# Patient Record
Sex: FEMALE | Race: WHITE | Employment: UNEMPLOYED | URBAN - NONMETROPOLITAN AREA
[De-identification: names, ages, dates, MRNs, and addresses within clinical notes are randomized per-mention and may not be internally consistent; named-entity substitution may affect disease eponyms.]

---

## 2020-04-08 RX ORDER — DESVENLAFAXINE 50 MG/1
50 TABLET, EXTENDED RELEASE ORAL DAILY
Qty: 30 TABLET | Refills: 4 | OUTPATIENT
Start: 2020-04-08

## 2020-04-08 NOTE — TELEPHONE ENCOUNTER
I don't know this patient.     I am not sure who this patient is and I can not refill the medication

## 2020-05-06 ENCOUNTER — TELEPHONE (OUTPATIENT)
Dept: FAMILY MEDICINE CLINIC | Age: 25
End: 2020-05-06

## 2024-06-22 ENCOUNTER — HOSPITAL ENCOUNTER (INPATIENT)
Age: 29
LOS: 8 days | Discharge: HOME | DRG: 776 | End: 2024-06-30
Attending: PSYCHIATRY & NEUROLOGY | Admitting: PSYCHIATRY & NEUROLOGY
Payer: COMMERCIAL

## 2024-06-22 DIAGNOSIS — F39 MOOD DISORDER (HCC): Primary | ICD-10-CM

## 2024-06-22 PROBLEM — F23 ACUTE PSYCHOSIS (HCC): Status: ACTIVE | Noted: 2024-06-22

## 2024-06-22 LAB
ALBUMIN SERPL-MCNC: 4.8 G/DL (ref 3.5–5.2)
ALP SERPL-CCNC: 46 U/L (ref 35–104)
ALT SERPL-CCNC: 10 U/L (ref 0–32)
AMPHET UR QL SCN: NEGATIVE
ANION GAP SERPL CALCULATED.3IONS-SCNC: 17 MMOL/L (ref 7–16)
APAP SERPL-MCNC: <5 UG/ML (ref 10–30)
AST SERPL-CCNC: 16 U/L (ref 0–31)
BARBITURATES UR QL SCN: NEGATIVE
BASOPHILS # BLD: 0.05 K/UL (ref 0–0.2)
BASOPHILS NFR BLD: 0 % (ref 0–2)
BENZODIAZ UR QL: NEGATIVE
BILIRUB SERPL-MCNC: 0.4 MG/DL (ref 0–1.2)
BUN SERPL-MCNC: 8 MG/DL (ref 6–20)
BUPRENORPHINE UR QL: POSITIVE
CALCIUM SERPL-MCNC: 10 MG/DL (ref 8.6–10.2)
CANNABINOIDS UR QL SCN: NEGATIVE
CHLORIDE SERPL-SCNC: 102 MMOL/L (ref 98–107)
CO2 SERPL-SCNC: 20 MMOL/L (ref 22–29)
COCAINE UR QL SCN: NEGATIVE
CREAT SERPL-MCNC: 0.7 MG/DL (ref 0.5–1)
EOSINOPHIL # BLD: 0.01 K/UL (ref 0.05–0.5)
EOSINOPHILS RELATIVE PERCENT: 0 % (ref 0–6)
ERYTHROCYTE [DISTWIDTH] IN BLOOD BY AUTOMATED COUNT: 12.5 % (ref 11.5–15)
ETHANOLAMINE SERPL-MCNC: <10 MG/DL (ref 0–0.08)
FENTANYL UR QL: NEGATIVE
GFR, ESTIMATED: >90 ML/MIN/1.73M2
GLUCOSE SERPL-MCNC: 129 MG/DL (ref 74–99)
HCG, URINE, POC: NEGATIVE
HCT VFR BLD AUTO: 43.3 % (ref 34–48)
HGB BLD-MCNC: 14.9 G/DL (ref 11.5–15.5)
IMM GRANULOCYTES # BLD AUTO: 0.06 K/UL (ref 0–0.58)
IMM GRANULOCYTES NFR BLD: 1 % (ref 0–5)
LYMPHOCYTES NFR BLD: 2.12 K/UL (ref 1.5–4)
LYMPHOCYTES RELATIVE PERCENT: 16 % (ref 20–42)
Lab: NORMAL
MCH RBC QN AUTO: 32.3 PG (ref 26–35)
MCHC RBC AUTO-ENTMCNC: 34.4 G/DL (ref 32–34.5)
MCV RBC AUTO: 93.7 FL (ref 80–99.9)
METHADONE UR QL: NEGATIVE
MONOCYTES NFR BLD: 1.48 K/UL (ref 0.1–0.95)
MONOCYTES NFR BLD: 11 % (ref 2–12)
NEGATIVE QC PASS/FAIL: NORMAL
NEUTROPHILS NFR BLD: 72 % (ref 43–80)
NEUTS SEG NFR BLD: 9.45 K/UL (ref 1.8–7.3)
OPIATES UR QL SCN: NEGATIVE
OXYCODONE UR QL SCN: NEGATIVE
PCP UR QL SCN: NEGATIVE
PLATELET # BLD AUTO: 286 K/UL (ref 130–450)
PMV BLD AUTO: 10 FL (ref 7–12)
POSITIVE QC PASS/FAIL: NORMAL
POTASSIUM SERPL-SCNC: 3.7 MMOL/L (ref 3.5–5)
PROT SERPL-MCNC: 8.1 G/DL (ref 6.4–8.3)
RBC # BLD AUTO: 4.62 M/UL (ref 3.5–5.5)
SALICYLATES SERPL-MCNC: <0.3 MG/DL (ref 0–30)
SODIUM SERPL-SCNC: 139 MMOL/L (ref 132–146)
TEST INFORMATION: ABNORMAL
TOXIC TRICYCLIC SC,BLOOD: NEGATIVE
WBC OTHER # BLD: 13.2 K/UL (ref 4.5–11.5)

## 2024-06-22 PROCEDURE — G0480 DRUG TEST DEF 1-7 CLASSES: HCPCS

## 2024-06-22 PROCEDURE — 80143 DRUG ASSAY ACETAMINOPHEN: CPT

## 2024-06-22 PROCEDURE — 93005 ELECTROCARDIOGRAM TRACING: CPT | Performed by: NURSE PRACTITIONER

## 2024-06-22 PROCEDURE — 1240000000 HC EMOTIONAL WELLNESS R&B

## 2024-06-22 PROCEDURE — 6360000002 HC RX W HCPCS: Performed by: PSYCHIATRY & NEUROLOGY

## 2024-06-22 PROCEDURE — 85025 COMPLETE CBC W/AUTO DIFF WBC: CPT

## 2024-06-22 PROCEDURE — 80179 DRUG ASSAY SALICYLATE: CPT

## 2024-06-22 PROCEDURE — 99285 EMERGENCY DEPT VISIT HI MDM: CPT

## 2024-06-22 PROCEDURE — 80053 COMPREHEN METABOLIC PANEL: CPT

## 2024-06-22 PROCEDURE — 80307 DRUG TEST PRSMV CHEM ANLYZR: CPT

## 2024-06-22 RX ORDER — DIPHENHYDRAMINE HYDROCHLORIDE 50 MG/ML
50 INJECTION INTRAMUSCULAR; INTRAVENOUS EVERY 6 HOURS PRN
Status: DISCONTINUED | OUTPATIENT
Start: 2024-06-22 | End: 2024-06-30 | Stop reason: HOSPADM

## 2024-06-22 RX ORDER — HYDROXYZINE PAMOATE 50 MG/1
50 CAPSULE ORAL 3 TIMES DAILY PRN
Status: DISCONTINUED | OUTPATIENT
Start: 2024-06-22 | End: 2024-06-30 | Stop reason: HOSPADM

## 2024-06-22 RX ORDER — LANOLIN ALCOHOL/MO/W.PET/CERES
3 CREAM (GRAM) TOPICAL NIGHTLY PRN
Status: DISCONTINUED | OUTPATIENT
Start: 2024-06-22 | End: 2024-06-30 | Stop reason: HOSPADM

## 2024-06-22 RX ORDER — LORAZEPAM 2 MG/ML
2 INJECTION INTRAMUSCULAR EVERY 6 HOURS PRN
Status: DISCONTINUED | OUTPATIENT
Start: 2024-06-22 | End: 2024-06-30 | Stop reason: HOSPADM

## 2024-06-22 RX ORDER — NICOTINE 21 MG/24HR
1 PATCH, TRANSDERMAL 24 HOURS TRANSDERMAL DAILY
Status: DISCONTINUED | OUTPATIENT
Start: 2024-06-22 | End: 2024-06-23

## 2024-06-22 RX ORDER — HALOPERIDOL 5 MG/ML
5 INJECTION INTRAMUSCULAR EVERY 6 HOURS PRN
Status: DISCONTINUED | OUTPATIENT
Start: 2024-06-22 | End: 2024-06-30 | Stop reason: HOSPADM

## 2024-06-22 RX ORDER — ACETAMINOPHEN 325 MG/1
650 TABLET ORAL EVERY 6 HOURS PRN
Status: DISCONTINUED | OUTPATIENT
Start: 2024-06-22 | End: 2024-06-30 | Stop reason: HOSPADM

## 2024-06-22 RX ORDER — DIPHENHYDRAMINE HYDROCHLORIDE 50 MG/ML
25 INJECTION INTRAMUSCULAR; INTRAVENOUS ONCE
Status: DISCONTINUED | OUTPATIENT
Start: 2024-06-22 | End: 2024-06-30 | Stop reason: HOSPADM

## 2024-06-22 RX ORDER — HALOPERIDOL 5 MG/1
5 TABLET ORAL EVERY 6 HOURS PRN
Status: DISCONTINUED | OUTPATIENT
Start: 2024-06-22 | End: 2024-06-30 | Stop reason: HOSPADM

## 2024-06-22 RX ORDER — DROPERIDOL 2.5 MG/ML
5 INJECTION, SOLUTION INTRAMUSCULAR; INTRAVENOUS ONCE
Status: DISCONTINUED | OUTPATIENT
Start: 2024-06-22 | End: 2024-06-30 | Stop reason: HOSPADM

## 2024-06-22 RX ORDER — MAGNESIUM HYDROXIDE/ALUMINUM HYDROXICE/SIMETHICONE 120; 1200; 1200 MG/30ML; MG/30ML; MG/30ML
30 SUSPENSION ORAL PRN
Status: DISCONTINUED | OUTPATIENT
Start: 2024-06-22 | End: 2024-06-30 | Stop reason: HOSPADM

## 2024-06-22 RX ADMIN — HALOPERIDOL LACTATE 5 MG: 5 INJECTION, SOLUTION INTRAMUSCULAR at 13:09

## 2024-06-22 RX ADMIN — LORAZEPAM 2 MG: 2 INJECTION INTRAMUSCULAR; INTRAVENOUS at 13:10

## 2024-06-22 RX ADMIN — DIPHENHYDRAMINE HYDROCHLORIDE 50 MG: 50 INJECTION, SOLUTION INTRAMUSCULAR; INTRAVENOUS at 13:10

## 2024-06-22 ASSESSMENT — PAIN - FUNCTIONAL ASSESSMENT: PAIN_FUNCTIONAL_ASSESSMENT: NONE - DENIES PAIN

## 2024-06-22 NOTE — ED NOTES
Patient jumped out of bed, pacing around in room and very paranoid at this time. When RN asks if she is ok, patient states that \"Im fine\"

## 2024-06-22 NOTE — BH NOTE
Per verbal report from Memo, patient attempted to elope from the ER.  Patient was given PRN medications at 1310 just prior to transfer to unit.  Patient arrived to unit with police escort via w/c.  Patient appears to be thought blocking, delayed, paranoid, and is unable to focus to complete admission assessment, OQ, or sign admission paperwork.  Assigned PIN # [id number].  Patient is A + O x 4, states she is at the hospital to get help for her mental health.  Patient shown to her room and provided with fluids.  Vital signs obtained and WNL.  Will re-approach patient later as she reports she is feeling sleepy.  Will continue to monitor per physicians orders and complete environmental safety rounds.

## 2024-06-22 NOTE — ED PROVIDER NOTES
or rubs. 2+ distal pulses  Chest: no chest wall tenderness  Abdomen: Soft.  Non tender. Non distended.  +BS.  No rebound, guarding, or rigidity. No pulsatile masses appreciated.  Musculoskeletal: Moves all extremities x 4. Warm and well perfused, no clubbing, cyanosis, or edema. Capillary refill <3 seconds  Skin: warm and dry. No rashes.   Neurologic: GCS 15, CN 2-12 grossly intact, no focal deficits, symmetric strength 5/5 in the upper and lower extremities bilaterally  Psych: Affect flat depressed reportedly made suicidal statements to police denies any homicidal thoughts reports no hallucinations    -------------------------------------------------- RESULTS -------------------------------------------------  I have personally reviewed all laboratory and imaging results for this patient. Results are listed below.     LABS:  Results for orders placed or performed during the hospital encounter of 06/22/24   CBC with Auto Differential   Result Value Ref Range    WBC 13.2 (H) 4.5 - 11.5 k/uL    RBC 4.62 3.50 - 5.50 m/uL    Hemoglobin 14.9 11.5 - 15.5 g/dL    Hematocrit 43.3 34.0 - 48.0 %    MCV 93.7 80.0 - 99.9 fL    MCH 32.3 26.0 - 35.0 pg    MCHC 34.4 32.0 - 34.5 g/dL    RDW 12.5 11.5 - 15.0 %    Platelets 286 130 - 450 k/uL    MPV 10.0 7.0 - 12.0 fL    Neutrophils % 72 43.0 - 80.0 %    Lymphocytes % 16 (L) 20.0 - 42.0 %    Monocytes % 11 2.0 - 12.0 %    Eosinophils % 0 0 - 6 %    Basophils % 0 0.0 - 2.0 %    Immature Granulocytes % 1 0.0 - 5.0 %    Neutrophils Absolute 9.45 (H) 1.80 - 7.30 k/uL    Lymphocytes Absolute 2.12 1.50 - 4.00 k/uL    Monocytes Absolute 1.48 (H) 0.10 - 0.95 k/uL    Eosinophils Absolute 0.01 (L) 0.05 - 0.50 k/uL    Basophils Absolute 0.05 0.00 - 0.20 k/uL    Immature Granulocytes Absolute 0.06 0.00 - 0.58 k/uL   Comprehensive Metabolic Panel w/ Reflex to MG   Result Value Ref Range    Sodium 139 132 - 146 mmol/L    Potassium 3.7 3.5 - 5.0 mmol/L    Chloride 102 98 - 107 mmol/L    CO2 20 (L)

## 2024-06-22 NOTE — ED NOTES
Behavioral [facility] Crisis Assessment      Chief Complaint: The patient is a 28-year-old  female who was pink slipped by the emergency room doctor after she was pulled over by police for speeding and reported having a \"mental breakdown\" and made suicidal statements.    Mental Status Exam: The patient presents anxious and paranoid.  She has some psychomotor agitation and is pacing throughout the unit at times.  She is concerned about other patients walking near her.  Unable to assess orientation as the patient is not answering most questions.  She has poor insight and judgment and is a poor historian.  She appears internally stimulated.  She has intense eye contact and fair hygiene.  Her speech is mumbled and soft and hesitant and her thought process is disorganized.  Legal Status:  [] Voluntary:  [x] Involuntary, Issued by: Emergency room doctor    Gender:  [] Male [x] Female [] Transgender  [] Other    Sexual Orientation:  [x] Heterosexual [] Homosexual [] Bisexual [] Other    Brief Clinical Summary: The patient is a 28-year-old  female who was pink slipped by the emergency room doctor after she was pulled over by police for speeding and reported having a \"mental breakdown\" and made suicidal statements to police.  The patient stated \"I feel suicidal b/c I dont know who I am anymore\".  Denied a Hx of suicide attempts \"I am freaking out again cause I dont know who I am\".  When asked the patient if she has ever been suicidal before she stated yes and when asked the patient if she ever had a suicide attempt she said no.  The patient did answer that she has never self harmed, been homicidal, or had access to weapons.  All other questions she would not answer and just stared straight ahead.  She appears internally stimulated and presents very nervous and paranoid.  At times she would engage with some communication with this writer but then as soon as something else would happen in the room like another

## 2024-06-22 NOTE — ED NOTES
Nurse to nurse report given to Yoshi RN on [address] which includes patient presentation complaint, pink slip, medications, lab results EKG Qtc, and past psych history and admitting orders.

## 2024-06-22 NOTE — ED NOTES
The patient was accepted to [address] [place] [address] B disposition called to Sharmin and admitting.

## 2024-06-23 PROBLEM — F19.94 SUBSTANCE INDUCED MOOD DISORDER (HCC): Status: ACTIVE | Noted: 2024-06-22

## 2024-06-23 PROCEDURE — 6370000000 HC RX 637 (ALT 250 FOR IP): Performed by: PSYCHIATRY & NEUROLOGY

## 2024-06-23 PROCEDURE — 1240000000 HC EMOTIONAL WELLNESS R&B

## 2024-06-23 PROCEDURE — 90792 PSYCH DIAG EVAL W/MED SRVCS: CPT | Performed by: PSYCHIATRY & NEUROLOGY

## 2024-06-23 RX ORDER — OXCARBAZEPINE 150 MG/1
150 TABLET, FILM COATED ORAL 2 TIMES DAILY
Status: DISCONTINUED | OUTPATIENT
Start: 2024-06-23 | End: 2024-06-24

## 2024-06-23 RX ORDER — OXCARBAZEPINE 150 MG/1
150 TABLET, FILM COATED ORAL 2 TIMES DAILY
Status: ON HOLD | COMMUNITY
End: 2024-06-30 | Stop reason: HOSPADM

## 2024-06-23 RX ORDER — POLYETHYLENE GLYCOL 3350 17 G
2 POWDER IN PACKET (EA) ORAL
Status: DISCONTINUED | OUTPATIENT
Start: 2024-06-23 | End: 2024-06-30 | Stop reason: HOSPADM

## 2024-06-23 RX ADMIN — OXCARBAZEPINE 150 MG: 150 TABLET, FILM COATED ORAL at 11:57

## 2024-06-23 RX ADMIN — HYDROXYZINE PAMOATE 50 MG: 50 CAPSULE ORAL at 18:37

## 2024-06-23 RX ADMIN — OXCARBAZEPINE 150 MG: 150 TABLET, FILM COATED ORAL at 21:59

## 2024-06-23 RX ADMIN — Medication 3 MG: at 21:59

## 2024-06-23 ASSESSMENT — PATIENT HEALTH QUESTIONNAIRE - PHQ9
SUM OF ALL RESPONSES TO PHQ QUESTIONS 1-9: 0
SUM OF ALL RESPONSES TO PHQ QUESTIONS 1-9: 0
SUM OF ALL RESPONSES TO PHQ9 QUESTIONS 1 & 2: 0
SUM OF ALL RESPONSES TO PHQ QUESTIONS 1-9: 0
SUM OF ALL RESPONSES TO PHQ QUESTIONS 1-9: 0
1. LITTLE INTEREST OR PLEASURE IN DOING THINGS: NOT AT ALL
2. FEELING DOWN, DEPRESSED OR HOPELESS: NOT AT ALL

## 2024-06-23 ASSESSMENT — PAIN SCALES - GENERAL
PAINLEVEL_OUTOF10: 0
PAINLEVEL_OUTOF10: 6

## 2024-06-23 ASSESSMENT — SLEEP AND FATIGUE QUESTIONNAIRES
SLEEP PATTERN: DIFFICULTY FALLING ASLEEP;DIFFICULTY ARISING;DISTURBED/INTERRUPTED SLEEP;INSOMNIA;RESTLESSNESS
DO YOU USE A SLEEP AID: NO
DO YOU HAVE DIFFICULTY SLEEPING: YES
AVERAGE NUMBER OF SLEEP HOURS: 2

## 2024-06-23 ASSESSMENT — LIFESTYLE VARIABLES
HOW OFTEN DO YOU HAVE A DRINK CONTAINING ALCOHOL: 4 OR MORE TIMES A WEEK
HOW MANY STANDARD DRINKS CONTAINING ALCOHOL DO YOU HAVE ON A TYPICAL DAY: 3 OR 4

## 2024-06-23 NOTE — CARE COORDINATION
Biopsychosocial Assessment Note    Social work met with patient to complete the biopsychosocial assessment and C-SSRS.     Chief Complaint: Pt would not disclose what brought her to the hospital. Per ED SW note, \"The patient is a 28-year-old  female who was pink slipped by the emergency room doctor after she was pulled over by police for speeding and reported having a \"mental breakdown\" and made suicidal statements.\"    Mental Status Exam: Pt appeared to be alert and oriented x 3. Pt appeared withdrawn and guarded throughout this 's assessment. Pt's eye-contact was poor. Speech was low and mumbled. Pt's affect was flat.    Clinical Summary: Pt states that her last admission to a psychiatric facility was a \"couple\" years ago, but pt would not disclose where. Pt would only answer a select few of this 's assessment questions. Pt would not disclose what brought her to the hospital. Per ED SW note, \"The patient is a 28-year-old  female who was pink slipped by the emergency room doctor after she was pulled over by police for speeding and reported having a \"mental breakdown\" and made suicidal statements.\" Pt would not disclose to this  if she was suicidal or had had a past hx of suicide attempts. Pt is currently denying SI/ HI/ hallucinations/ delusions. Pt would not answer any questions relating to current or past trauma. Pt did state that she is actively sober and receives services from [facility]. Pt states that she plans to return home with her brother when she is discharged.    Risk Factors: does not live independently, not forth-coming with information, hx of substance use, and past psych admissions.    Protective Factors: brother support and active at [facility].    Gender  [] Male [] Female [] Transgender  [] Other    Sexual Orientation    [x] Heterosexual [] Homosexual [] Bisexual [] Other    Suicidal Ideation  [] Past [] Present [] Denies  REFUSED TO

## 2024-06-23 NOTE — BH NOTE
Pt COWS assessment is 8. Pt states that she feel very anxious and has body wide aches. Tanya NP has been updated on physical symptoms.   Pt advises that she is unsure as to when she last had her suboxone dose was.   Pt reports that she gets suboxone from [facility] in [place].   Pt denies suicidal / homicidal ideations.  Pt denies hallucinations.   Pt declined to do the admission OQ analyst at this time.

## 2024-06-23 NOTE — H&P
Normal scars on body  Neck:  Thyroid  Palpable   x  Not palpable   venus distention   adenopathy   Chest: x Clear   Rhonchi     Wheezing   CV:  xS1   xS2    xNo murmer   Abdomen:  x  Soft    Tender    Viceromegaly   Extremities:  x No Edema     Edema     Cranial Nerves Examination:     CN II:   xPupils are reactive to light  Pupils are non reactive to light  CN III, IV, VI:  xNo eye deviation    No diplopia or ptosis   CN V:    xFacial Sensation is intact     Facial Sensation is not intact   CN IIIV:   x Hearing is normal to rubbing fingers   CN IX, X:     xNormal gag reflex and phonation   CN XI:   xShoulder shrug and neck rotation is normal  CNXII:    xTongue is midline no deviation or atrophy    Mental Status Examination:      Appearance: discheveled, age appropriate  Behavior: uncooperative, fair eye contact  Mood: depressed  Affect:  congruent   Thought process: linear  Thougth content: Denies suicidal ideation, homicidal ideations, paranoia  Perceptual distrubance: Denies Auditory, visual hallucinations  Insight: poor  Judgment: poor  Cognition: alert and oriented x4      DIAGNOSIS:  Substance-induced mood disorder  Polysubstance abuse        LABS: REVIEWED TODAY:  Recent Labs     06/22/24  0238   WBC 13.2*   HGB 14.9        Recent Labs     06/22/24  0238      K 3.7      CO2 20*   BUN 8   CREATININE 0.7   GLUCOSE 129*     Recent Labs     06/22/24 0238   BILITOT 0.4   ALKPHOS 46   AST 16   ALT 10     Lab Results   Component Value Date/Time    BARBSCNU NEGATIVE 06/22/2024 02:38 AM    LABBENZ NEGATIVE 06/22/2024 02:38 AM    LABMETH NEGATIVE 06/22/2024 02:38 AM    ETOH <10 06/22/2024 02:38 AM     No results found for: \"TSH\", \"FREET4\"  No results found for: \"LITHIUM\"  No results found for: \"VALPROATE\", \"CBMZ\"  No results found for: \"LITHIUM\", \"VALPROATE\"    FURTHER LABS ORDERED :      Radiology   No results found.    EKG: TRACING REVIEWED    TREATMENT PLAN:    Risk Management:      Collateral

## 2024-06-23 NOTE — BH NOTE
Pt denies suicidal / homicidal ideations.   Pt denies hallucinations.   Pt has a flat and avoidant affect.   Pt has poor eye contact.   Pt is not willing to complete admission process at this time.

## 2024-06-23 NOTE — BH NOTE
[facility]  Initial Interdisciplinary Treatment Plan Note      Original treatment plan Date & Time: 6-23-24  1000 am     Admission Type:  Admission Type: Involuntary    Reason for admission:        Estimated Length of Stay:  5-7days  Estimated Discharge Date: To be determined by physician.    PATIENT STRENGTHS:  Patient Strengths:   Patient Strengths and Limitations:   Addictive Behavior: Addictive Behavior  In the Past 3 Months, Have You Felt or Has Someone Told You That You Have a Problem With  : None  Medical Problems:History reviewed. No pertinent past medical history.  Status EXAM:Mental Status and Behavioral Exam  Normal: No  Level of Assistance: Independent/Self  Facial Expression: Flat, Avoids Gaze  Affect: Constricted  Level of Consciousness: Alert  Frequency of Checks: 4 times per hour, close  Mood:Normal: No  Mood: Depressed  Motor Activity:Normal: No  Motor Activity: Decreased  Eye Contact: Poor  Observed Behavior: Withdrawn  Sexual Misconduct History: Current - no  Preception: [place] to person, [place] to time, [place] to place, [place] to situation  Attention:Normal: No  Attention: Distractible  Thought Processes: Blocking  Thought Content:Normal: No  Thought Content: Preoccupations, Poverty of content  Depression Symptoms: Isolative, Loss of interest  Anxiety Symptoms: Generalized  Karla Symptoms: Poor judgment  Hallucinations: None  Delusions: No  Memory:Normal: Yes  Memory: Poor recent  Insight and Judgment: No  Insight and Judgment: Poor insight, Unmotivated, Poor judgment    EDUCATION:   Learner Progress Toward Treatment Goals: Will review group plans and strategies for care.    Method: Group therapy, Medication compliance, Individualized assessments and Care planning.    Outcome: Needs Reinforcement    PATIENT GOALS: To be discussed with patient within 72 hours    PLAN/TREATMENT RECOMMENDATIONS:     Continue group therapy , Medications compliance, Goal setting, Individualized

## 2024-06-23 NOTE — BH NOTE
Pt continues to refuse admission.   This RN expressed with empathy the importance of completing admission process. Pt continues to decline.   No other distress or immediate behavioral concerns at this time.

## 2024-06-24 LAB
CHOLEST SERPL-MCNC: 131 MG/DL
EKG ATRIAL RATE: 75 BPM
EKG P AXIS: 53 DEGREES
EKG P-R INTERVAL: 126 MS
EKG Q-T INTERVAL: 392 MS
EKG QRS DURATION: 82 MS
EKG QTC CALCULATION (BAZETT): 437 MS
EKG R AXIS: 36 DEGREES
EKG T AXIS: 35 DEGREES
EKG VENTRICULAR RATE: 75 BPM
HBA1C MFR BLD: 5.3 % (ref 4–5.6)
HDLC SERPL-MCNC: 54 MG/DL
LDLC SERPL CALC-MCNC: 54 MG/DL
TRIGL SERPL-MCNC: 113 MG/DL
VLDLC SERPL CALC-MCNC: 23 MG/DL

## 2024-06-24 PROCEDURE — 1240000000 HC EMOTIONAL WELLNESS R&B

## 2024-06-24 PROCEDURE — 99232 SBSQ HOSP IP/OBS MODERATE 35: CPT | Performed by: NURSE PRACTITIONER

## 2024-06-24 PROCEDURE — 80061 LIPID PANEL: CPT

## 2024-06-24 PROCEDURE — 6370000000 HC RX 637 (ALT 250 FOR IP): Performed by: NURSE PRACTITIONER

## 2024-06-24 PROCEDURE — 6370000000 HC RX 637 (ALT 250 FOR IP): Performed by: PSYCHIATRY & NEUROLOGY

## 2024-06-24 PROCEDURE — 36415 COLL VENOUS BLD VENIPUNCTURE: CPT

## 2024-06-24 PROCEDURE — 83036 HEMOGLOBIN GLYCOSYLATED A1C: CPT

## 2024-06-24 PROCEDURE — 6360000002 HC RX W HCPCS: Performed by: NURSE PRACTITIONER

## 2024-06-24 PROCEDURE — 93010 ELECTROCARDIOGRAM REPORT: CPT | Performed by: INTERNAL MEDICINE

## 2024-06-24 RX ORDER — OXCARBAZEPINE 300 MG/1
300 TABLET, FILM COATED ORAL 2 TIMES DAILY
Status: DISCONTINUED | OUTPATIENT
Start: 2024-06-24 | End: 2024-06-30 | Stop reason: HOSPADM

## 2024-06-24 RX ORDER — OLANZAPINE 5 MG/1
5 TABLET ORAL NIGHTLY
Status: DISCONTINUED | OUTPATIENT
Start: 2024-06-24 | End: 2024-06-26

## 2024-06-24 RX ORDER — BUPRENORPHINE HYDROCHLORIDE AND NALOXONE HYDROCHLORIDE DIHYDRATE 8; 2 MG/1; MG/1
1 TABLET SUBLINGUAL 2 TIMES DAILY
Status: DISCONTINUED | OUTPATIENT
Start: 2024-06-24 | End: 2024-06-30 | Stop reason: HOSPADM

## 2024-06-24 RX ADMIN — OXCARBAZEPINE 300 MG: 300 TABLET, FILM COATED ORAL at 20:45

## 2024-06-24 RX ADMIN — BUPRENORPHINE HYDROCHLORIDE AND NALOXONE HYDROCHLORIDE DIHYDRATE 1 TABLET: 8; 2 TABLET SUBLINGUAL at 15:06

## 2024-06-24 RX ADMIN — OXCARBAZEPINE 150 MG: 150 TABLET, FILM COATED ORAL at 10:18

## 2024-06-24 RX ADMIN — HYDROXYZINE PAMOATE 50 MG: 50 CAPSULE ORAL at 20:45

## 2024-06-24 RX ADMIN — OLANZAPINE 5 MG: 5 TABLET, FILM COATED ORAL at 20:45

## 2024-06-24 RX ADMIN — NICOTINE POLACRILEX 2 MG: 2 LOZENGE ORAL at 14:07

## 2024-06-24 RX ADMIN — Medication 3 MG: at 20:45

## 2024-06-24 RX ADMIN — BUPRENORPHINE HYDROCHLORIDE AND NALOXONE HYDROCHLORIDE DIHYDRATE 1 TABLET: 8; 2 TABLET SUBLINGUAL at 20:46

## 2024-06-24 ASSESSMENT — PAIN SCALES - GENERAL
PAINLEVEL_OUTOF10: 0

## 2024-06-24 NOTE — CARE COORDINATION
LATE ENTRY: SW was in the RN station speaking with another staff member when this pt stated that she wants to be put into the shower. SW informed pt that she is unable to get into the shower at this time as there is no staffing to let her in and SW will be starting group soon. Pt was not receptive of this information and again asked to be let into the shower. SW again informed pt that she is unable to be let in the shower at this time. Another pt then asked for a tooth brush and soap and this pt became irritable because she stated that she asked for showering items and did not get them. Pt was agitated during this encounter and was not receptive to any information.

## 2024-06-24 NOTE — BH NOTE
[facility]  Day 3 Interdisciplinary Treatment Plan NOTE    Review Date & Time:  6-24-24  1000 am    Admission Type:   Admission Type: Involuntary    Reason for admission:  Reason for Admission: \"I have mental health problems\" pr pt.  Estimated Length of Stay: 5-7 days  Estimated Discharge Date Update: to be determined by physician    PATIENT STRENGTHS:  Patient Strengths    Patient Strengths and Limitations:Limitations: External locus of control, Demonstrates discomfort with /lack of social skills, Lacks leisure interests, Apathetic / unmotivated, Tendency to isolate self, Difficult relationships / poor social skills  Addictive Behavior:Addictive Behavior  In the Past 3 Months, Have You Felt or Has Someone Told You That You Have a Problem With  : None  Medical Problems:  Past Medical History:   Diagnosis Date    Anxiety     Depression        Risk:  Fall Risk   Giovany Scale Giovany Scale Score: 21  BVC    Change in scores no Changes to plan of Care no    Status EXAM:   Mental Status and Behavioral Exam  Normal: No  Level of Assistance: Independent/Self  Facial Expression: Flat, Expressionless  Affect: Constricted  Level of Consciousness: Alert  Frequency of Checks: 4 times per hour, close  Mood:Normal: No  Mood: Depressed  Motor Activity:Normal: No  Motor Activity: Decreased  Eye Contact: Fair  Observed Behavior: Withdrawn, Guarded  Sexual Misconduct History: Current - no  Preception: [place] to person, [place] to time, [place] to place, [place] to situation  Attention:Normal: No  Attention: Distractible  Thought Processes: Circumstantial  Thought Content:Normal: No  Thought Content: Poverty of content  Depression Symptoms: Isolative, Loss of interest, Feelings of hopelessess  Anxiety Symptoms: Generalized  Karla Symptoms: Poor judgment  Hallucinations: None  Delusions: No  Memory:Normal: Yes  Memory: Poor recent  Insight and Judgment: No  Insight and Judgment: Poor judgment, Poor insight    Daily

## 2024-06-24 NOTE — GROUP NOTE
Group Therapy Note    Date: 6/24/2024    Group Start Time: 1500  Group End Time: 1535  Group Topic: Recreational    [facility]    Mary Brandt CTRS    Group Therapy Note    Attendees: 11    Date: 6/24/2024  Start Time: 1500  End Time:  1535  Number of Participants: 11    Type of Group: Recreational    Name:  Fact or Fiction    Patient's Goal:  Increased awareness of mental health topics.    Notes:  Patient was actively engaged in fact or fiction game and added appropriately to group discussion.    Status After Intervention:  Improved    Participation Level: Active Listener and Interactive    Participation Quality: Appropriate, Attentive, and Sharing      Speech:  normal      Thought Process/Content: Logical  Linear      Affective Functioning: Congruent      Mood:  Appropriate      Level of consciousness:  Alert and Attentive      Response to Learning: Able to verbalize current knowledge/experience, Able to verbalize/acknowledge new learning, Able to retain information, Capable of insight, Able to change behavior, and Progressing to goal      Endings: None Reported    Modes of Intervention: Socialization, Clarifying, and Activity      Discipline Responsible: Psychoeducational Specialist      Signature:  BURTON Hackett

## 2024-06-24 NOTE — CARE COORDINATION
SW called [facility] Phone: [phone] to inform them that the pt is currently in the hospital. SW was informed that the pt will need to call when she is discharged from the hospital and she will have to schedule her own appointment. SW was informed that they will not need the discharge paperwork when she is discharged from the hospital.

## 2024-06-24 NOTE — BH NOTE
Korina RN has advised this RN at this time, that this pt advised her that she is having vaginal discharge. This RN will advise RN taking over care.

## 2024-06-24 NOTE — BH NOTE
Pt denies suicidal Ideations.  Pt denies hallucinations.  Pt is isolative at times to room. Pt has a flat affect.   Pt has fair eye contact but has been avoidant earlier in the morning.   Pt has poverty of speech and content.   Pt declined to attend treatment team assessment this morning.  No other immediate behavioral concerns at this time.

## 2024-06-25 PROCEDURE — 1240000000 HC EMOTIONAL WELLNESS R&B

## 2024-06-25 PROCEDURE — 6360000002 HC RX W HCPCS: Performed by: NURSE PRACTITIONER

## 2024-06-25 PROCEDURE — 6370000000 HC RX 637 (ALT 250 FOR IP): Performed by: NURSE PRACTITIONER

## 2024-06-25 PROCEDURE — 6370000000 HC RX 637 (ALT 250 FOR IP): Performed by: PSYCHIATRY & NEUROLOGY

## 2024-06-25 PROCEDURE — 99232 SBSQ HOSP IP/OBS MODERATE 35: CPT | Performed by: NURSE PRACTITIONER

## 2024-06-25 RX ADMIN — Medication 3 MG: at 20:39

## 2024-06-25 RX ADMIN — HYDROXYZINE PAMOATE 50 MG: 50 CAPSULE ORAL at 20:39

## 2024-06-25 RX ADMIN — OLANZAPINE 5 MG: 5 TABLET, FILM COATED ORAL at 20:39

## 2024-06-25 RX ADMIN — NICOTINE POLACRILEX 2 MG: 2 LOZENGE ORAL at 16:59

## 2024-06-25 RX ADMIN — BUPRENORPHINE HYDROCHLORIDE AND NALOXONE HYDROCHLORIDE DIHYDRATE 1 TABLET: 8; 2 TABLET SUBLINGUAL at 08:22

## 2024-06-25 RX ADMIN — OXCARBAZEPINE 300 MG: 300 TABLET, FILM COATED ORAL at 20:39

## 2024-06-25 RX ADMIN — BUPRENORPHINE HYDROCHLORIDE AND NALOXONE HYDROCHLORIDE DIHYDRATE 1 TABLET: 8; 2 TABLET SUBLINGUAL at 20:39

## 2024-06-25 RX ADMIN — OXCARBAZEPINE 300 MG: 300 TABLET, FILM COATED ORAL at 08:21

## 2024-06-25 RX ADMIN — NICOTINE POLACRILEX 2 MG: 2 LOZENGE ORAL at 14:18

## 2024-06-25 ASSESSMENT — PAIN SCALES - GENERAL
PAINLEVEL_OUTOF10: 0
PAINLEVEL_OUTOF10: 0

## 2024-06-25 NOTE — BH NOTE
Patient alert and oriented x 4. Patient has a flat, expressionless, blunt affect and appears depressed. Patient reports, \"I've been depressed since I was 16 years old.\" Patient is guarded, withdrawn, isolative and preoccupied. Patient denies any suicidal ideations, homicidal ideations, auditory and visual hallucinations. Patient denies reports depression is \"6/10\". Patient denies any anxiety at this time. Patient denies any pain at this time. Patient does not appear in any distress at this time. Fall and standard precautions reviewed and implemented. Will continue to monitor patient every 15 minute rounds to ensure patient safety.

## 2024-06-25 NOTE — CARE COORDINATION
SW received a voicemail from pt's mom Niesha [phone] (CORAL IS NOT SIGNED) stating that she is aware that we are unable to disclose any information to her but if any information is needed she is able to provide this. Mom stated that the pt is calling her every 5 minutes stating she wants to come home and this is not an option at this time. Mom stated that the pt had the police on a maribel because she had no license and she is afraid she will lose her daughter.     FAIZAN meet with pt to discuss signing an CORAL for mom Niesha.

## 2024-06-25 NOTE — GROUP NOTE
Group Therapy Note    Date: 6/25/2024    Group Start Time: 1450  Group End Time: 1530  Group Topic: Relaxation    [facility]    Mary Brandt CTRS    Group Therapy Note    Attendees: 8    Date: 6/25/2024  Start Time: 1450  End Time:  1530  Number of Participants: 8    Type of Group: Relaxation    Name:  Guided Meditation    Patient's Goal:  Identify types of meditation and practice guided meditation.    Notes:  Patient was actively engaged in group activity.    Status After Intervention:  Improved    Participation Level: Active Listener and Interactive    Participation Quality: Appropriate and Attentive      Speech:  normal      Thought Process/Content: Logical  Linear      Affective Functioning: Congruent      Mood:  Appropriate      Level of consciousness:  Alert and Attentive      Response to Learning: Able to verbalize current knowledge/experience, Able to verbalize/acknowledge new learning, Able to retain information, Capable of insight, Able to change behavior, and Progressing to goal      Endings: None Reported    Modes of Intervention: Education, Exploration, and Activity      Discipline Responsible: Psychoeducational Specialist      Signature:  BURTON Hackett

## 2024-06-25 NOTE — CARE COORDINATION
SW met with pt to discuss signing an CORAL for mom Niesha. Pt requested to know the purpose of the CORAL. SW informed pt that SW will discuss the discharge plan, concerns and provide mom with an update on how she is doing. Pt stated that when she is discharged she will be going home with family. SW asked pt to elaborate further and pt stated that she will stay with her mom and brother. Pt signed CORAL for mela Scherer [phone].

## 2024-06-25 NOTE — CARE COORDINATION
FAIZAN called pt's mom Niesha [phone] (CORAL signed) to obtain collateral information and discuss discharge planning. Mom stated that she is hopeful that the pt is getting back on her medications. Mom stated that the pt has done this in the past and she was in a high speed maribel with the police and she is very paranoid. Mom stated that she is watching the pt's daughter. Mom stated that the pt has been struggling with her mental health for almost a year and it has been getting worse. Mom stated that the pt has been going to [facility] but she is not taking the medications. Mom stated that the pt told her she meets with a psychiatrist at [facility]. Mom stated that the pt needs to be set up with MH services. Mom stated that the pt thought people were trying to kill her. Mom stated that she is worried about losing the pt and the pt needs to get back on her medications. Mom stated that the pt will be returning back to her brothers home and mom has been living there also. Mom stated that the pt needs to start working but she is not able to work like this. Mom is not aware of any guns/weapons.

## 2024-06-26 LAB
BACTERIA URNS QL MICRO: ABNORMAL
BILIRUB UR QL STRIP: NEGATIVE
CLARITY UR: ABNORMAL
COLOR UR: YELLOW
EPI CELLS #/AREA URNS HPF: ABNORMAL /HPF
GLUCOSE UR STRIP-MCNC: NEGATIVE MG/DL
HGB UR QL STRIP.AUTO: NEGATIVE
KETONES UR STRIP-MCNC: NEGATIVE MG/DL
LEUKOCYTE ESTERASE UR QL STRIP: ABNORMAL
NITRITE UR QL STRIP: NEGATIVE
PH UR STRIP: 6 [PH] (ref 5–9)
PROT UR STRIP-MCNC: NEGATIVE MG/DL
RBC #/AREA URNS HPF: ABNORMAL /HPF
SP GR UR STRIP: 1.02 (ref 1–1.03)
UROBILINOGEN UR STRIP-ACNC: 0.2 EU/DL (ref 0–1)
WBC #/AREA URNS HPF: ABNORMAL /HPF

## 2024-06-26 PROCEDURE — 6370000000 HC RX 637 (ALT 250 FOR IP): Performed by: PSYCHIATRY & NEUROLOGY

## 2024-06-26 PROCEDURE — 87086 URINE CULTURE/COLONY COUNT: CPT

## 2024-06-26 PROCEDURE — 6360000002 HC RX W HCPCS: Performed by: NURSE PRACTITIONER

## 2024-06-26 PROCEDURE — 87491 CHLMYD TRACH DNA AMP PROBE: CPT

## 2024-06-26 PROCEDURE — 99232 SBSQ HOSP IP/OBS MODERATE 35: CPT | Performed by: NURSE PRACTITIONER

## 2024-06-26 PROCEDURE — 87591 N.GONORRHOEAE DNA AMP PROB: CPT

## 2024-06-26 PROCEDURE — 81001 URINALYSIS AUTO W/SCOPE: CPT

## 2024-06-26 PROCEDURE — 6370000000 HC RX 637 (ALT 250 FOR IP): Performed by: NURSE PRACTITIONER

## 2024-06-26 PROCEDURE — 1240000000 HC EMOTIONAL WELLNESS R&B

## 2024-06-26 RX ORDER — CEFDINIR 300 MG/1
300 CAPSULE ORAL EVERY 12 HOURS SCHEDULED
Status: DISCONTINUED | OUTPATIENT
Start: 2024-06-26 | End: 2024-06-30 | Stop reason: HOSPADM

## 2024-06-26 RX ORDER — OLANZAPINE 10 MG/1
10 TABLET ORAL NIGHTLY
Status: DISCONTINUED | OUTPATIENT
Start: 2024-06-26 | End: 2024-06-30 | Stop reason: HOSPADM

## 2024-06-26 RX ADMIN — BUPRENORPHINE HYDROCHLORIDE AND NALOXONE HYDROCHLORIDE DIHYDRATE 1 TABLET: 8; 2 TABLET SUBLINGUAL at 21:00

## 2024-06-26 RX ADMIN — OXCARBAZEPINE 300 MG: 300 TABLET, FILM COATED ORAL at 21:00

## 2024-06-26 RX ADMIN — OXCARBAZEPINE 300 MG: 300 TABLET, FILM COATED ORAL at 09:12

## 2024-06-26 RX ADMIN — NICOTINE POLACRILEX 2 MG: 2 LOZENGE ORAL at 15:30

## 2024-06-26 RX ADMIN — HYDROXYZINE PAMOATE 50 MG: 50 CAPSULE ORAL at 12:18

## 2024-06-26 RX ADMIN — NICOTINE POLACRILEX 2 MG: 2 LOZENGE ORAL at 10:24

## 2024-06-26 RX ADMIN — NICOTINE POLACRILEX 2 MG: 2 LOZENGE ORAL at 12:14

## 2024-06-26 RX ADMIN — BUPRENORPHINE HYDROCHLORIDE AND NALOXONE HYDROCHLORIDE DIHYDRATE 1 TABLET: 8; 2 TABLET SUBLINGUAL at 09:12

## 2024-06-26 RX ADMIN — OLANZAPINE 10 MG: 10 TABLET, FILM COATED ORAL at 21:00

## 2024-06-26 ASSESSMENT — PAIN SCALES - GENERAL
PAINLEVEL_OUTOF10: 0

## 2024-06-26 NOTE — GROUP NOTE
Group Therapy Note    Date: 6/26/2024    Group Start Time: 1105  Group End Time: 1200  Group Topic: Psychotherapy    [facility]    Sidney Mayers MSW, LSW        Group Therapy Note    Attendees: 9       Patient's Goal:  To increase social interaction and improve relationships with others.      Notes:  Pt was attentive in group and was able to identify an agenda.     Status After Intervention:  Unchanged    Participation Level: Active Listener and Interactive    Participation Quality: Attentive and Sharing      Speech:  hesitant      Thought Process/Content: Logical      Affective Functioning: Blunted      Mood: anxious and depressed      Level of consciousness:  Alert and Attentive      Response to Learning: Able to verbalize current knowledge/experience and Able to retain information      Endings: None Reported    Modes of Intervention: Education, Support, Socialization, Exploration, Clarifying, and Problem-solving      Discipline Responsible: /Counselor      Signature:  MADELYN French, ALVIN

## 2024-06-26 NOTE — GROUP NOTE
Group Therapy Note    Date: 6/26/2024    Group Start Time: 1400  Group End Time: 1445  Group Topic: Relapse Prevention    [facility]    Brooklynn Khanna, CTRS    Date: 6/26/2024  Peer Recovery  Module Name:  Peer Recovery    Patient's Goal:  Pt will be able to id local resources and types of support in our community for addiction  services.    Notes:  Pleasant and appeared to be an active listener in group.     Status After Intervention:  Improved    Participation Level: Active Listener    Participation Quality: Appropriate and Attentive      Speech:  normal      Thought Process/Content: Logical      Affective Functioning: Congruent      Mood: euthymic      Level of consciousness:  Alert, Oriented x4, and Attentive      Response to Learning: Able to verbalize/acknowledge new learning and Able to retain information      Endings: None Reported    Modes of Intervention: Education, Support, and Clarifying      Discipline Responsible: Psychoeducational Specialist      Signature:  Brooklynn Khanna, CTRS

## 2024-06-26 NOTE — GROUP NOTE
Group Therapy Note    Date: 6/26/2024    Group Start Time: 0945  Group End Time: 1025  Group Topic: Psychoeducation    [facility]    Brooklynn Khanna CTRS    Date: 6/26/2024  Module Name:  owning your feelings     Patient's Goal:  pt will be able to identify ways to improve his or her own communication with others, and learn feelings word beyond fine.     Notes:  Pleasant and engaged, sharing when prompted. Accepting of handout and willing to engage in conversation.     Status After Intervention:  Improved    Participation Level: Active Listener and Interactive    Participation Quality: Appropriate and Attentive      Speech:  normal      Thought Process/Content: Logical      Affective Functioning: Congruent      Mood: euthymic      Level of consciousness:  Alert, Oriented x4, and Attentive      Response to Learning: Able to verbalize/acknowledge new learning, Able to retain information, and Progressing to goal      Endings: None Reported    Modes of Intervention: Education, Support, and Socialization      Discipline Responsible: Psychoeducational Specialist      Signature:  BURTON Sethi             Signature:  BURTON Sethi

## 2024-06-26 NOTE — CARE COORDINATION
Pt was seen during treatment team. Pt states that she is feeling better, more alert and more like a \"regular person\". She states that she believes she has schizophrenia and feels that she was admitted due to a \"schizophrenic episode\". Pt sates that her medications have been helping. Pt denied SI/HI/AVH and also denied paranoia symptoms. Pt states that's he plans to attend groups on the unit today.     SW  met with pt to discuss outpatient provider. She states that's he is active with [facility] in [place] and sees psychiatrist Karli at ext [phone].     SW contacted [facility] [phone] ext [phone] and was unable to be connected.     FAIZAN contacted [facility]  and scheduled appointment for pt with Karli for psychiatry 7/10 at 11am in office at [address] [place] [address]. Fax: [phone]

## 2024-06-26 NOTE — GROUP NOTE
Shared goal for the day as to try to take care of myself.                                                                       Group Therapy Note    Date: 6/26/2024    Group Start Time: 0930  Group End Time: 0945  Group Topic: Community Meeting    [facility]  1    Brooklynn Khanna CTRS        Group Therapy Note      Type of Group: Community Meeting      Patient's Goal:  Patient will be able to id staffing assignments, expectations of patients, and general information re: floor rules. Will be prompted to share goal for the day.     Notes:  Patient appeared to be an active listener, taking in information presented and was prompted to share goal for the day.    Status After Intervention:  Improved    Participation Level: Active Listener    Participation Quality: Attentive      Speech:  normal      Thought Process/Content: Logical      Affective Functioning: Congruent      Mood: euthymic      Level of consciousness:  Alert and Oriented x4      Response to Learning: Able to verbalize/acknowledge new learning      Endings: None Reported    Modes of Intervention: Education and Clarifying      Discipline Responsible: Psychoeducational Specialist      Signature:  BURTON Sethi

## 2024-06-27 PROCEDURE — 6360000002 HC RX W HCPCS: Performed by: NURSE PRACTITIONER

## 2024-06-27 PROCEDURE — 6370000000 HC RX 637 (ALT 250 FOR IP): Performed by: NURSE PRACTITIONER

## 2024-06-27 PROCEDURE — 6370000000 HC RX 637 (ALT 250 FOR IP)

## 2024-06-27 PROCEDURE — 99232 SBSQ HOSP IP/OBS MODERATE 35: CPT | Performed by: NURSE PRACTITIONER

## 2024-06-27 PROCEDURE — 6370000000 HC RX 637 (ALT 250 FOR IP): Performed by: PSYCHIATRY & NEUROLOGY

## 2024-06-27 PROCEDURE — 1240000000 HC EMOTIONAL WELLNESS R&B

## 2024-06-27 RX ADMIN — CEFDINIR 300 MG: 300 CAPSULE ORAL at 10:05

## 2024-06-27 RX ADMIN — CEFDINIR 300 MG: 300 CAPSULE ORAL at 01:19

## 2024-06-27 RX ADMIN — Medication 3 MG: at 20:58

## 2024-06-27 RX ADMIN — BUPRENORPHINE HYDROCHLORIDE AND NALOXONE HYDROCHLORIDE DIHYDRATE 1 TABLET: 8; 2 TABLET SUBLINGUAL at 20:58

## 2024-06-27 RX ADMIN — OLANZAPINE 10 MG: 10 TABLET, FILM COATED ORAL at 20:58

## 2024-06-27 RX ADMIN — BUPRENORPHINE HYDROCHLORIDE AND NALOXONE HYDROCHLORIDE DIHYDRATE 1 TABLET: 8; 2 TABLET SUBLINGUAL at 10:05

## 2024-06-27 RX ADMIN — NICOTINE POLACRILEX 2 MG: 2 LOZENGE ORAL at 10:23

## 2024-06-27 RX ADMIN — CEFDINIR 300 MG: 300 CAPSULE ORAL at 20:58

## 2024-06-27 RX ADMIN — OXCARBAZEPINE 300 MG: 300 TABLET, FILM COATED ORAL at 20:58

## 2024-06-27 RX ADMIN — NICOTINE POLACRILEX 2 MG: 2 LOZENGE ORAL at 20:59

## 2024-06-27 RX ADMIN — NICOTINE POLACRILEX 2 MG: 2 LOZENGE ORAL at 12:32

## 2024-06-27 RX ADMIN — NICOTINE POLACRILEX 2 MG: 2 LOZENGE ORAL at 16:58

## 2024-06-27 RX ADMIN — HYDROXYZINE PAMOATE 50 MG: 50 CAPSULE ORAL at 20:59

## 2024-06-27 RX ADMIN — OXCARBAZEPINE 300 MG: 300 TABLET, FILM COATED ORAL at 10:05

## 2024-06-27 RX ADMIN — ACETAMINOPHEN 650 MG: 325 TABLET ORAL at 18:37

## 2024-06-27 ASSESSMENT — PAIN SCALES - GENERAL
PAINLEVEL_OUTOF10: 0
PAINLEVEL_OUTOF10: 5

## 2024-06-27 ASSESSMENT — PAIN DESCRIPTION - DESCRIPTORS: DESCRIPTORS: ACHING;DULL;DISCOMFORT

## 2024-06-27 ASSESSMENT — PAIN DESCRIPTION - LOCATION: LOCATION: HEAD

## 2024-06-27 ASSESSMENT — PAIN - FUNCTIONAL ASSESSMENT: PAIN_FUNCTIONAL_ASSESSMENT: ACTIVITIES ARE NOT PREVENTED

## 2024-06-27 NOTE — GROUP NOTE
Date: 6/27/2024  Start Time: 1400  End Time:  1450  Number of Participants: 12    Type of Group: Psychoeducation    Wellness Binder Information  Module Name:  Cognitive Distortions    Patient's Goal:  Patient will become aware of different cognitive distortions.  Patient will be able to ID one way to overcome cognitive distortions.     Notes:  CTRS discussed different cognitive distortions and provided examples.  CTRS discussed ways to help combat cognitive distortions.  Patient was accepting of handout and receptive to the information received.      Status After Intervention:  Improved    Participation Level: Active Listener and Interactive    Participation Quality: Appropriate and Attentive      Speech:  normal      Thought Process/Content: Logical      Affective Functioning: Congruent      Mood: anxious      Level of consciousness:  Alert and Attentive      Response to Learning: Able to verbalize current knowledge/experience, Able to verbalize/acknowledge new learning, and Progressing to goal      Endings: None Reported    Modes of Intervention: Education, Exploration, Clarifying, and Problem-solving      Discipline Responsible: Recreational Therapist      Signature:  BURTON Moe

## 2024-06-27 NOTE — GROUP NOTE
Group Therapy Note    Date: 6/27/2024    Group Start Time: 1005  Group End Time: 1055  Group Topic: Cognitive Skills    [facility]    Sidney Mayers, MADELYN, ALVIN        Group Therapy Note    Attendees: 12       Patient's Goal:  Pt attended group therapy where we discussed \"Becoming Psychologically Flexible.\"    Notes:  Pt was an active participant in group therapy.      Status After Intervention:  Improved    Participation Level: Active Listener and Interactive    Participation Quality: Appropriate, Attentive, and Sharing      Speech:  normal      Thought Process/Content: Logical      Affective Functioning: Congruent      Mood: euthymic      Level of consciousness:  Alert, Oriented x4, and Attentive      Response to Learning: Able to verbalize current knowledge/experience, Able to verbalize/acknowledge new learning, and Able to retain information      Endings: None Reported    Modes of Intervention: Education, Support, Socialization, Exploration, Clarifying, and Problem-solving      Discipline Responsible: /Counselor      Signature:  MADELYN French, ALVIN

## 2024-06-27 NOTE — GROUP NOTE
Group Therapy Note    Date: 6/27/2024  Start Time: 0800  End Time:  0820  Number of Participants: 15    Type of Group: Community Meeting    Wellness Binder Information  Module Name:  Community Meeting      Patient's Goal:  Patient will be oriented to the unit including but not limited expectations, staff, programming schedule.  Patient will be able to ID expectations of treatment.     Notes: Patient was a participant in community meeting, and was updated on expectations of the unit, staffing, programming schedule, and acclimated to their environment.    Patient shared goal for today as \"figure out what's going on with my care plan\"    Status After Intervention:  Improved    Participation Level: Active Listener and Interactive    Participation Quality: Appropriate and Attentive      Speech:  normal      Thought Process/Content: Logical      Affective Functioning: Congruent      Mood:  content      Level of consciousness:  Alert and Attentive      Response to Learning: Able to verbalize current knowledge/experience, Able to verbalize/acknowledge new learning, and Progressing to goal      Endings: None Reported    Modes of Intervention: Exploration, Clarifying, and Problem-solving      Discipline Responsible: Recreational Therapist      Signature:  BURTON Moe

## 2024-06-27 NOTE — CARE COORDINATION
FAIZAN met with the pt. Pt reports feeling good today, denied SI/HI/AVH. Pt reports feeling a lot better compared to when she came in. Pt expressed feeling more like herself and she is no longer in her head as much. Pt denied any paranoia. Pt will return home with her brother and she will continue to treat with [facility] at time of discharge. Pt stated the medications are really helping her. Pt cooperative, pleasant, with good eye contact, clear speech, improving insight/judgement.

## 2024-06-27 NOTE — GROUP NOTE
Date: 6/27/2024  Start Time: 1135    Number of Participants: 16    Type of Group: Recreational    Wellness Binder Information  Module Name:  Today in History- Premeal activity    Patient's Goal:  To socialize casually amongst peers during meal time.     Notes:  CTRS provided patient with a handout on what happened today in history and the national holiday for today.  Patient was accepting of handout and receptive to information received.     Status After Intervention:  Improved    Participation Level: Active Listener    Participation Quality: Appropriate and Attentive      Speech:  normal      Thought Process/Content: Logical      Affective Functioning: Congruent      Mood: content      Level of consciousness:  Alert and Attentive      Response to Learning: Able to verbalize current knowledge/experience, Able to verbalize/acknowledge new learning, and Progressing to goal      Endings: None Reported    Modes of Intervention: Socialization, Exploration, and Activity      Discipline Responsible: Recreational Therapist      Signature:  BURTON Moe    Group Therapy Note    Attendees: 16     1 = Total assistance

## 2024-06-27 NOTE — CONSULTS
Hospitalist Consult History & Physical        Reason for Consult:  UTI/Medical management     Date of Service: Pt seen/examined in consultation on 2024    History of Present Illness:    Ms. Tanya Ware, a 28 y.o. year old female  who  has a past medical history of Anxiety and Depression.  Admitted by behavioral health for substance-induced mood disorder.  Medicine consulted for abnormal urinalysis.  Patient started on cefdinir.       Past Medical History:        Diagnosis Date    Anxiety     Depression        Past Surgical History:        Procedure Laterality Date     SECTION         Medications Prior to Admission:    Prior to Admission medications    Medication Sig Start Date End Date Taking? Authorizing Provider   OXcarbazepine (TRILEPTAL) 150 MG tablet Take 1 tablet by mouth 2 times daily   Yes ProviderIrish MD   Buprenorphine HCl-Naloxone HCl (SUBOXONE SL) Place under the tongue Pt unsure of when last dose. Pt obtains it from [facility] in [place].   Yes ProviderIrish MD       Allergies:  Patient has no known allergies.    Social History:      TOBACCO:   has no history on file for tobacco use.  ETOH:  Alcohol use questions deferred to the physician.      Family History:     Reviewed in detail and negative for DM, CAD, Cancer, CVA. Positive as follows:    History reviewed. No pertinent family history.    Review of Systems:  All bolded are positive; please see HPI  General:  Fever, chills, diaphoresis, fatigue, malaise, night sweats, weight loss  Psychological:  Anxiety, disorientation, hallucinations.  ENT:  Epistaxis, headaches, vertigo, visual changes.  Cardiovascular:  Chest pain, irregular heartbeats, palpitations, paroxysmal nocturnal dyspnea.  Respiratory:  Shortness of breath, coughing, sputum production, hemoptysis, wheezing, orthopnea.  Gastrointestinal:  Nausea, vomiting, diarrhea, heartburn, constipation, abdominal pain, hematemesis, hematochezia, melena, acholic

## 2024-06-28 LAB
CHLAMYDIA DNA UR QL NAA+PROBE: NEGATIVE
MICROORGANISM SPEC CULT: ABNORMAL
N GONORRHOEA DNA UR QL NAA+PROBE: NEGATIVE
SERVICE CMNT-IMP: ABNORMAL
SPECIMEN DESCRIPTION: ABNORMAL
SPECIMEN DESCRIPTION: NORMAL

## 2024-06-28 PROCEDURE — 6370000000 HC RX 637 (ALT 250 FOR IP): Performed by: PSYCHIATRY & NEUROLOGY

## 2024-06-28 PROCEDURE — 6360000002 HC RX W HCPCS: Performed by: NURSE PRACTITIONER

## 2024-06-28 PROCEDURE — 6370000000 HC RX 637 (ALT 250 FOR IP): Performed by: NURSE PRACTITIONER

## 2024-06-28 PROCEDURE — 1240000000 HC EMOTIONAL WELLNESS R&B

## 2024-06-28 PROCEDURE — 6370000000 HC RX 637 (ALT 250 FOR IP)

## 2024-06-28 PROCEDURE — 99232 SBSQ HOSP IP/OBS MODERATE 35: CPT | Performed by: NURSE PRACTITIONER

## 2024-06-28 RX ADMIN — BUPRENORPHINE HYDROCHLORIDE AND NALOXONE HYDROCHLORIDE DIHYDRATE 1 TABLET: 8; 2 TABLET SUBLINGUAL at 09:04

## 2024-06-28 RX ADMIN — Medication 3 MG: at 21:24

## 2024-06-28 RX ADMIN — OLANZAPINE 10 MG: 10 TABLET, FILM COATED ORAL at 21:23

## 2024-06-28 RX ADMIN — NICOTINE POLACRILEX 2 MG: 2 LOZENGE ORAL at 08:31

## 2024-06-28 RX ADMIN — CEFDINIR 300 MG: 300 CAPSULE ORAL at 21:24

## 2024-06-28 RX ADMIN — NICOTINE POLACRILEX 2 MG: 2 LOZENGE ORAL at 17:18

## 2024-06-28 RX ADMIN — OXCARBAZEPINE 300 MG: 300 TABLET, FILM COATED ORAL at 21:24

## 2024-06-28 RX ADMIN — NICOTINE POLACRILEX 2 MG: 2 LOZENGE ORAL at 12:12

## 2024-06-28 RX ADMIN — HYDROXYZINE PAMOATE 50 MG: 50 CAPSULE ORAL at 21:24

## 2024-06-28 RX ADMIN — OXCARBAZEPINE 300 MG: 300 TABLET, FILM COATED ORAL at 09:04

## 2024-06-28 RX ADMIN — CEFDINIR 300 MG: 300 CAPSULE ORAL at 09:04

## 2024-06-28 RX ADMIN — BUPRENORPHINE HYDROCHLORIDE AND NALOXONE HYDROCHLORIDE DIHYDRATE 1 TABLET: 8; 2 TABLET SUBLINGUAL at 21:23

## 2024-06-28 RX ADMIN — NICOTINE POLACRILEX 2 MG: 2 LOZENGE ORAL at 21:24

## 2024-06-28 ASSESSMENT — PAIN SCALES - GENERAL
PAINLEVEL_OUTOF10: 0

## 2024-06-28 NOTE — GROUP NOTE
Date: 6/28/2024  Start Time: 1400  End Time:  1455  Number of Participants: 10    Type of Group: Psychoeducation    Wellness Binder Information  Module Name:  Community Resources    Patient's Goal:  patient will be able to ID one community resource they can utilize after discharge    Notes:  CTRS discussed community resources and introduced patient to [facility].  Patient was an active participant in discussion and was receptive to the information provided.  Patient accepting of handout.     Status After Intervention:  Improved    Participation Level: Active Listener and Interactive    Participation Quality: Appropriate and Attentive      Speech:  normal      Thought Process/Content: Logical      Affective Functioning: Congruent      Mood: anxious      Level of consciousness:  Alert and Attentive      Response to Learning: Able to verbalize current knowledge/experience, Able to verbalize/acknowledge new learning, and Progressing to goal      Endings: None Reported    Modes of Intervention: Education, Exploration, Clarifying, and Problem-solving      Discipline Responsible: Recreational Therapist      Signature:  BURTON Moe

## 2024-06-28 NOTE — CARE COORDINATION
SW met with pt to discuss discharge plan. Pt stated that she will be staying with her brother when she is discharged from the hospital and her brother or  will be able to pick her up. Pt stated that she was evicted from her last house because her daughter was playing on a fence and she is going to start looking for new low income housing. Pt stated that she is also looking forward to getting a job. Pt stated that she does not have access to any guns/weapons and she will continue her MH/ substance use treatment with [facility]. Pt denied any depression, anxiety, SI, HI, AVH.

## 2024-06-28 NOTE — GROUP NOTE
Group Therapy Note    Date: 6/28/2024    Group Start Time: 1005  Group End Time: 1100  Group Topic: Cognitive Skills    [facility]    Sidney Mayers, MADELYN, ALVIN        Group Therapy Note    Attendees: 14       Patient's Goal:  Pt attended group therapy where we discussed trauma - what it is, symptoms, and treatment modalities available.      Notes:  Pt was an active participant for group therapy. Pt stated that she is feeling \"much better today.\"    Status After Intervention:  Improved    Participation Level: Active Listener and Interactive    Participation Quality: Appropriate and Attentive      Speech:  normal      Thought Process/Content: Logical      Affective Functioning: Congruent      Mood: euthymic      Level of consciousness:  Alert, Oriented x4, and Attentive      Response to Learning: Able to verbalize current knowledge/experience, Able to verbalize/acknowledge new learning, Able to retain information, and Capable of insight      Endings: None Reported    Modes of Intervention: Education, Support, Socialization, Exploration, Clarifying, and Problem-solving      Discipline Responsible: /Counselor      Signature:  Vidalful MADELYN Mayers, ALVIN

## 2024-06-28 NOTE — GROUP NOTE
Shared goal for the day as to be pt and do self care.                                                                       Group Therapy Note    Date: 6/28/2024    Group Start Time: 0900  Group End Time: 0920  Group Topic: Community Meeting    [facility]  1    Brooklynn Khanna CTRS    Type of Group: Community Meeting      Patient's Goal:  Patient will be able to id staffing assignments, expectations of patients, and general information re: floor rules. Will be prompted to share goal for the day.     Notes:  Patient appeared to be an active listener, taking in information presented and was prompted to share goal for the day.    Status After Intervention:  Improved    Participation Level: Active Listener and Interactive    Participation Quality: Attentive and Sharing      Speech:  normal      Thought Process/Content: Logical      Affective Functioning: Congruent      Mood: euthymic      Level of consciousness:  Alert and Oriented x4      Response to Learning: Able to verbalize/acknowledge new learning      Endings: None Reported    Modes of Intervention: Support, Socialization, and Exploration      Discipline Responsible: Psychoeducational Specialist      Signature:  BURTON Sethi

## 2024-06-29 PROCEDURE — 6360000002 HC RX W HCPCS: Performed by: NURSE PRACTITIONER

## 2024-06-29 PROCEDURE — 6370000000 HC RX 637 (ALT 250 FOR IP)

## 2024-06-29 PROCEDURE — 6370000000 HC RX 637 (ALT 250 FOR IP): Performed by: NURSE PRACTITIONER

## 2024-06-29 PROCEDURE — 6370000000 HC RX 637 (ALT 250 FOR IP): Performed by: PSYCHIATRY & NEUROLOGY

## 2024-06-29 PROCEDURE — 1240000000 HC EMOTIONAL WELLNESS R&B

## 2024-06-29 PROCEDURE — 99232 SBSQ HOSP IP/OBS MODERATE 35: CPT | Performed by: NURSE PRACTITIONER

## 2024-06-29 RX ADMIN — OXCARBAZEPINE 300 MG: 300 TABLET, FILM COATED ORAL at 21:31

## 2024-06-29 RX ADMIN — NICOTINE POLACRILEX 2 MG: 2 LOZENGE ORAL at 21:56

## 2024-06-29 RX ADMIN — NICOTINE POLACRILEX 2 MG: 2 LOZENGE ORAL at 12:17

## 2024-06-29 RX ADMIN — CEFDINIR 300 MG: 300 CAPSULE ORAL at 21:31

## 2024-06-29 RX ADMIN — CEFDINIR 300 MG: 300 CAPSULE ORAL at 09:26

## 2024-06-29 RX ADMIN — OLANZAPINE 10 MG: 10 TABLET, FILM COATED ORAL at 21:31

## 2024-06-29 RX ADMIN — BUPRENORPHINE HYDROCHLORIDE AND NALOXONE HYDROCHLORIDE DIHYDRATE 1 TABLET: 8; 2 TABLET SUBLINGUAL at 21:31

## 2024-06-29 RX ADMIN — OXCARBAZEPINE 300 MG: 300 TABLET, FILM COATED ORAL at 09:27

## 2024-06-29 RX ADMIN — Medication 3 MG: at 21:31

## 2024-06-29 RX ADMIN — NICOTINE POLACRILEX 2 MG: 2 LOZENGE ORAL at 09:36

## 2024-06-29 RX ADMIN — NICOTINE POLACRILEX 2 MG: 2 LOZENGE ORAL at 17:09

## 2024-06-29 RX ADMIN — HYDROXYZINE PAMOATE 50 MG: 50 CAPSULE ORAL at 19:09

## 2024-06-29 RX ADMIN — BUPRENORPHINE HYDROCHLORIDE AND NALOXONE HYDROCHLORIDE DIHYDRATE 1 TABLET: 8; 2 TABLET SUBLINGUAL at 09:27

## 2024-06-29 NOTE — PROGRESS NOTES
BEHAVIORAL HEALTH FOLLOW-UP NOTE     6/24/2024     Patient was seen and examined in person, Chart reviewed   Patient's case discussed with staff/team    Chief Complaint: \"I am okay.\"    Interim History:   I saw patient in her room today.  She seems agitated preoccupied and bizarre.  She seems paranoid is misinterpreting.  She tells me that she is not sleeping well she continues to brush her hair and aggressive way over and over again during my evaluation.  She is not able to attend group she walks out into the milieu wanders around and then walks back.    Appetite: [x] Normal/Unchanged  [] Increased  [] Decreased      Sleep:       [x] Normal/Unchanged  [] Fair       [] Poor              Energy:    [x] Normal/Unchanged  [] Increased  [] Decreased        SI [] Present  [x] Absent    HI  []Present  [x] Absent     Aggression:  [] yes  [x] no    Patient is [x] able  [] unable to CONTRACT FOR SAFETY     PAST MEDICAL/PSYCHIATRIC HISTORY:   Past Medical History:   Diagnosis Date    Anxiety     Depression        FAMILY/SOCIAL HISTORY:  History reviewed. No pertinent family history.  Social History     Socioeconomic History    Marital status: Unknown     Spouse name: Not on file    Number of children: Not on file    Years of education: Not on file    Highest education level: Not on file   Occupational History    Not on file   Tobacco Use    Smoking status: Unknown    Smokeless tobacco: Not on file   Substance and Sexual Activity    Alcohol use: Defer    Drug use: Defer    Sexual activity: Defer   Other Topics Concern    Not on file   Social History Narrative    Not on file     Social Determinants of Health     Financial Resource Strain: Not on file   Food Insecurity: No Food Insecurity (6/23/2024)    Hunger Vital Sign     Worried About Running Out of Food in the Last Year: Never true     Ran Out of Food in the Last Year: Never true   Transportation Needs: No Transportation Needs (6/23/2024)    PRAPARE - Transportation     
BEHAVIORAL HEALTH FOLLOW-UP NOTE     6/25/2024     Patient was seen and examined in person, Chart reviewed   Patient's case discussed with staff/team    Chief Complaint: \"I was arguing with my mom\"    Interim History:   I saw patient this morning in her room.  She is lying down and resting has been isolative and keeps to herself on the unit.  She denies suicidal ideations intent or plan she denies auditory or visual hallucinations she is not attending groups and socialized with peers limited insight and judgment limited impulse control.  She was observed to the phones last night yelling on the phones tells me she was arguing with her mom but does not tell me what they were arguing about.      Appetite: [x] Normal/Unchanged  [] Increased  [] Decreased      Sleep:       [x] Normal/Unchanged  [] Fair       [] Poor              Energy:    [x] Normal/Unchanged  [] Increased  [] Decreased        SI [] Present  [x] Absent    HI  []Present  [x] Absent     Aggression:  [] yes  [x] no    Patient is [x] able  [] unable to CONTRACT FOR SAFETY     PAST MEDICAL/PSYCHIATRIC HISTORY:   Past Medical History:   Diagnosis Date    Anxiety     Depression        FAMILY/SOCIAL HISTORY:  History reviewed. No pertinent family history.  Social History     Socioeconomic History    Marital status: Unknown     Spouse name: Not on file    Number of children: Not on file    Years of education: Not on file    Highest education level: Not on file   Occupational History    Not on file   Tobacco Use    Smoking status: Unknown    Smokeless tobacco: Not on file   Substance and Sexual Activity    Alcohol use: Defer    Drug use: Defer    Sexual activity: Defer   Other Topics Concern    Not on file   Social History Narrative    Not on file     Social Determinants of Health     Financial Resource Strain: Not on file   Food Insecurity: No Food Insecurity (6/23/2024)    Hunger Vital Sign     Worried About Running Out of Food in the Last Year: Never true     
BEHAVIORAL HEALTH FOLLOW-UP NOTE     6/26/2024     Patient was seen and examined in person, Chart reviewed   Patient's case discussed with staff/team    Chief Complaint: \"I am feeling more like a regular person\"    Interim History:   Patient seen in treatment team.  Affect is flat and blunted.  She states that she is feeling \"more like a regular person.\"  She states that she is here because of \"schizophrenia things.\"  She states that she was hearing things and seeing things that were not there she also reports that she was having a paranoia on admission.  She has been isolate to her room not attending groups or socializing with peers she states that she was diagnosed with bipolar when she lived in [place] she states that she has talked to her mom and that the talks have gone better than yesterday.  She is calm where she fluidly the medications are working well for her.  She denies suicidal ideations intent or plan denies auditory or visual hallucinations    Patient was seen this afternoon upon the unit she approaches being asked me \"is there another unit I can go to.\"  She is clearly paranoid she is looking at other patients she is fearful and delusional        Appetite: [x] Normal/Unchanged  [] Increased  [] Decreased      Sleep:       [x] Normal/Unchanged  [] Fair       [] Poor              Energy:    [x] Normal/Unchanged  [] Increased  [] Decreased        SI [] Present  [x] Absent    HI  []Present  [x] Absent     Aggression:  [] yes  [x] no    Patient is [x] able  [] unable to CONTRACT FOR SAFETY     PAST MEDICAL/PSYCHIATRIC HISTORY:   Past Medical History:   Diagnosis Date    Anxiety     Depression        FAMILY/SOCIAL HISTORY:  History reviewed. No pertinent family history.  Social History     Socioeconomic History    Marital status: Unknown     Spouse name: Not on file    Number of children: Not on file    Years of education: Not on file    Highest education level: Not on file   Occupational History    Not on 
BEHAVIORAL HEALTH FOLLOW-UP NOTE     6/27/2024     Patient was seen and examined in person, Chart reviewed   Patient's case discussed with staff/team    Chief Complaint: \"I am feeling more like a regular person\"    Interim History:   Patient seen up on the unit this morning she is inquiring if there is any word on when she may be discharged. She states that she believes the medications are working for her and she wants to make sure the \"medications are right\" She tells me that she was not comfortable on the other unit and felt triggered by the patients there. She states \"I was not going to get better over there\" She is denying any AVH, no paranoia noted on this encounter, she is relaxed, out in the dayroom, attending groups. Good grooming and hygiene. Fair insight into need for treatment.         Appetite: [x] Normal/Unchanged  [] Increased  [] Decreased      Sleep:       [x] Normal/Unchanged  [] Fair       [] Poor              Energy:    [x] Normal/Unchanged  [] Increased  [] Decreased        SI [] Present  [x] Absent    HI  []Present  [x] Absent     Aggression:  [] yes  [x] no    Patient is [x] able  [] unable to CONTRACT FOR SAFETY     PAST MEDICAL/PSYCHIATRIC HISTORY:   Past Medical History:   Diagnosis Date    Anxiety     Depression        FAMILY/SOCIAL HISTORY:  History reviewed. No pertinent family history.  Social History     Socioeconomic History    Marital status: Unknown     Spouse name: Not on file    Number of children: Not on file    Years of education: Not on file    Highest education level: Not on file   Occupational History    Not on file   Tobacco Use    Smoking status: Unknown    Smokeless tobacco: Not on file   Substance and Sexual Activity    Alcohol use: Defer    Drug use: Defer    Sexual activity: Defer   Other Topics Concern    Not on file   Social History Narrative    Not on file     Social Determinants of Health     Financial Resource Strain: Not on file   Food Insecurity: No Food 
BEHAVIORAL HEALTH FOLLOW-UP NOTE     6/29/2024     Patient was seen and examined in person, Chart reviewed   Patient's case discussed with staff/team    Chief Complaint: \"I am finally on medications that help me\"     Interim History:   Patient seen upon the unit.  Very pleasant affect.  She vehemently denies suicidal or homicidal ideations intent or plan she denies auditory or visual hallucinations she tells me that she is finally on medications are helping her.  She is appreciate that she is received here.  She denies any paranoia as she is out this with the milieux seems relaxed medication compliant no overt or covert and psychosis no neurovegetative signs of depression and no behavioral disturbances and on the unit she is future focused voices readiness for discharge      Appetite: [x] Normal/Unchanged  [] Increased  [] Decreased      Sleep:       [x] Normal/Unchanged  [] Fair       [] Poor              Energy:    [x] Normal/Unchanged  [] Increased  [] Decreased        SI [] Present  [x] Absent    HI  []Present  [x] Absent     Aggression:  [] yes  [x] no    Patient is [x] able  [] unable to CONTRACT FOR SAFETY     PAST MEDICAL/PSYCHIATRIC HISTORY:   Past Medical History:   Diagnosis Date    Anxiety     Depression        FAMILY/SOCIAL HISTORY:  History reviewed. No pertinent family history.  Social History     Socioeconomic History    Marital status: Unknown     Spouse name: Not on file    Number of children: Not on file    Years of education: Not on file    Highest education level: Not on file   Occupational History    Not on file   Tobacco Use    Smoking status: Unknown    Smokeless tobacco: Not on file   Substance and Sexual Activity    Alcohol use: Defer    Drug use: Defer    Sexual activity: Defer   Other Topics Concern    Not on file   Social History Narrative    Not on file     Social Determinants of Health     Financial Resource Strain: Not on file   Food Insecurity: No Food Insecurity (6/23/2024)    
[facility]  Week Interdisciplinary Treatment Plan Note     Review Date & Time: 06/28/2024    Patient was not in treatment team.    Estimated Length of Stay Update:  3-5 days   Estimated Discharge Date Update: 07/01/2024    EDUCATION:   Learner Progress Toward Treatment Goals: Reviewed results and recommendations of this team, Reviewed group plan and strategies, Reviewed signs, symptoms and risk of self harm and violent behavior, and Reviewed goals and plan of care    Method: Individual    Outcome: Verbalized understanding and Demonstrated Understanding    PATIENT GOALS: Discharge    PLAN/TREATMENT RECOMMENDATIONS UPDATE: one week     GOALS UPDATE:  Time frame for Short-Term Goals: Prior to discharge.      Tanya Rondon RN  
Declined attending this magi's group offering though we invited her.    
Leisure assessment completed.  
PT. WATCHFUL, GUARDED, PREOCCUPIED WITH DARTING EYES. PT. REPORTED ANXIETY AND VISTARIL GIVEN FOR SAME.  
Patient declined invitation to the following groups:    Community Meeting  Education    Patient will continue to be provided with opportunities to enhance leisure skills/interests and/or coping mechanisms.  
Patient declined invitation to the following groups:    Recreation Activity    Patient will continue to be provided with opportunities to enhance leisure skills/interests and/or coping mechanisms.  
Patient denies suicidal ideation, homicidal ideations and AVH.  Patient denies anxiety and depression.  Patient states she is feeling \"good.\"  Patient appears flat/expressionless, depressed with blunt/constricted responses and fair eye contact.  Patient appears guarded, preoccupied and withdrawn.  Presents calm and cooperative during assessment.  Patient is isolative to room and does not appear to be social with peers at this time.  Medications taken without issue.  No complaints or concerns verbalized at this time.  No unit problems reported.  Will continue to observe and support.    
Patient is being transported to her room and became very aggressive was not verbally redirectable.  Patient was ordered IM droperidol and Benadryl  
Patient is medication compliant. Denies si/hi/avh. Patient hopeful to be discharged. Attending groups, taking medications. No pertinent assessment findings throughout shift other than patient keeping to herself.   
Patient is medication compliant. Seen in room in am. Patient denies si/hi/avh. Encouraged to attend groups. No complaints of discomforts.   
Patient is resting quietly in bed with eyes closed at this time.  No signs of distress or discomfort noted.  No PRN medications given thus far.  Safety needs met.  No unit problems reported.  Purposeful rounding continued.    
Patient resting quietly in bed with eyes closed. Respirations even and unlabored with no signs of distress observed. Environmental rounds continued.  
Patient resting with eyes closed. Respirations even and unlabored. No signs of distress. Purposeful rounding continued.  
Unable to complete leisure assessment at this time due to pt asleep.  
Length of stay: 3 to 7 days based on stability    NOTE: This report was transcribed using voice recognition software. Every effort was made to ensure accuracy; however, inadvertent computerized transcription errors may be present.       Electronically signed by TAM Huber CNP on 6/28/2024 at 12:42 PM

## 2024-06-29 NOTE — GROUP NOTE
Group Therapy Note    Date: 6/29/2024    Group Start Time: 1115  Group End Time: 1150  Group Topic: Psychoeducation    [facility]    Brooklynn Khanna, CTRS    Date: 6/29/2024  Module Name:  the importance of a schedule     Patient's Goal:  pt will be able to identify what key steps he or she needs to have a healthy lifestyle.     Notes:  Pleasant and engaged in group, sharing when prompted and accepting of handout.     Status After Intervention:  Improved    Participation Level: Active Listener and Interactive    Participation Quality: Appropriate and Attentive      Speech:  normal      Thought Process/Content: Logical      Affective Functioning: Congruent      Mood: euthymic      Level of consciousness:  Alert, Oriented x4, and Attentive      Response to Learning: Able to verbalize/acknowledge new learning, Able to retain information, and Progressing to goal      Endings: None Reported    Modes of Intervention: Education, Support, Socialization, and Clarifying      Discipline Responsible: Psychoeducational Specialist      Signature:  Brooklynn Khanna CTRS

## 2024-06-29 NOTE — GROUP NOTE
Group Therapy Note    Date: 6/29/2024    Group Start Time: 1100  Group End Time: 1115  Group Topic: Community Meeting    [facility]    Brooklynn Khanna, CTRS          Community Meeting     Patient's Goal:  Patient will be able to id staffing assignments, expectations of patients, and general information re: floor rules. Will be prompted to share goal for the day.     Notes:  Patient appeared to be an active listener, taking in information presented and was prompted to share goal for the day.    Status After Intervention:  Improved    Participation Level: Active Listener and Interactive    Participation Quality: Appropriate, Attentive, and Sharing      Speech:  normal      Thought Process/Content: Logical      Affective Functioning: Congruent      Mood: euthymic      Level of consciousness:  Alert, Oriented x4, and Attentive      Response to Learning: Able to verbalize/acknowledge new learning and Able to retain information      Endings: None Reported    Modes of Intervention: Education and Clarifying      Discipline Responsible: Psychoeducational Specialist      Signature:  Brooklynn Khanna, BRIANNAS

## 2024-06-30 VITALS
WEIGHT: 165 LBS | DIASTOLIC BLOOD PRESSURE: 50 MMHG | HEIGHT: 63 IN | HEART RATE: 62 BPM | SYSTOLIC BLOOD PRESSURE: 92 MMHG | RESPIRATION RATE: 15 BRPM | BODY MASS INDEX: 29.23 KG/M2 | OXYGEN SATURATION: 99 % | TEMPERATURE: 98.2 F

## 2024-06-30 PROBLEM — F31.9 BIPOLAR DISORDER (HCC): Status: ACTIVE | Noted: 2024-06-30

## 2024-06-30 PROBLEM — F19.10 POLYSUBSTANCE ABUSE (HCC): Status: ACTIVE | Noted: 2024-06-30

## 2024-06-30 PROCEDURE — 6370000000 HC RX 637 (ALT 250 FOR IP)

## 2024-06-30 PROCEDURE — 6370000000 HC RX 637 (ALT 250 FOR IP): Performed by: NURSE PRACTITIONER

## 2024-06-30 PROCEDURE — 99239 HOSP IP/OBS DSCHRG MGMT >30: CPT | Performed by: NURSE PRACTITIONER

## 2024-06-30 PROCEDURE — 6360000002 HC RX W HCPCS: Performed by: NURSE PRACTITIONER

## 2024-06-30 RX ORDER — OXCARBAZEPINE 300 MG/1
300 TABLET, FILM COATED ORAL 2 TIMES DAILY
Qty: 60 TABLET | Refills: 0 | Status: SHIPPED | OUTPATIENT
Start: 2024-06-30 | End: 2024-07-30

## 2024-06-30 RX ORDER — CEFDINIR 300 MG/1
300 CAPSULE ORAL EVERY 12 HOURS SCHEDULED
Qty: 3 CAPSULE | Refills: 0 | Status: SHIPPED | OUTPATIENT
Start: 2024-06-30 | End: 2024-07-02

## 2024-06-30 RX ORDER — CEFDINIR 300 MG/1
300 CAPSULE ORAL EVERY 12 HOURS SCHEDULED
Qty: 3 CAPSULE | Refills: 0 | Status: SHIPPED | OUTPATIENT
Start: 2024-06-30 | End: 2024-06-30

## 2024-06-30 RX ORDER — OXCARBAZEPINE 300 MG/1
300 TABLET, FILM COATED ORAL 2 TIMES DAILY
Qty: 60 TABLET | Refills: 0 | Status: SHIPPED | OUTPATIENT
Start: 2024-06-30 | End: 2024-06-30

## 2024-06-30 RX ORDER — OLANZAPINE 10 MG/1
10 TABLET ORAL NIGHTLY
Qty: 30 TABLET | Refills: 0 | Status: SHIPPED | OUTPATIENT
Start: 2024-06-30 | End: 2024-06-30

## 2024-06-30 RX ORDER — OLANZAPINE 10 MG/1
10 TABLET ORAL NIGHTLY
Qty: 30 TABLET | Refills: 0 | Status: SHIPPED | OUTPATIENT
Start: 2024-06-30 | End: 2024-07-30

## 2024-06-30 RX ORDER — POLYETHYLENE GLYCOL 3350 17 G
2 POWDER IN PACKET (EA) ORAL
Qty: 100 EACH | Refills: 3 | COMMUNITY
Start: 2024-06-30

## 2024-06-30 RX ADMIN — NICOTINE POLACRILEX 2 MG: 2 LOZENGE ORAL at 12:06

## 2024-06-30 RX ADMIN — NICOTINE POLACRILEX 2 MG: 2 LOZENGE ORAL at 08:54

## 2024-06-30 RX ADMIN — CEFDINIR 300 MG: 300 CAPSULE ORAL at 08:46

## 2024-06-30 RX ADMIN — OXCARBAZEPINE 300 MG: 300 TABLET, FILM COATED ORAL at 08:46

## 2024-06-30 RX ADMIN — BUPRENORPHINE HYDROCHLORIDE AND NALOXONE HYDROCHLORIDE DIHYDRATE 1 TABLET: 8; 2 TABLET SUBLINGUAL at 08:46

## 2024-06-30 NOTE — BH NOTE
[facility]  Discharge Note    Tobacco Screening:  Practical Counseling, on admission, corinne X, if applicable and completed (first 3 are required if patient doesn't refuse):            ( ) Recognizing danger situations (included triggers and roadblocks)                    ( ) Coping skills (new ways to manage stress,relaxation techniques, changing routine, distraction)                                                           ( x) Basic information about quitting (benefits of quitting, techniques in how to quit, available resources  ( ) [facility]                                                                                                                   ( ) Patient refused counseling  ( ) Patient refused referral  ( ) Patient refused prescription upon discharge  ( ) Patient has not smoked in the last 30 days              Pt discharged with followings belongings:   Clothing: Footwear, Pants, Shirt   Valuables sent home with yes. Valuables retrieved from safe, Security envelope number:   none  and returned to patient.  Patient left department with Departure Mode: Family member via Mobility at Departure: Ambulatory, discharged to Discharged to: Private Residence. Patient education on aftercare instructions: yes  Patient verbalize understanding of AVS:  yes.  Given suicide prevention handout . Discharged in stable condition.  Status EXAM upon discharge:  Mental Status and Behavioral Exam  Normal: No  Level of Assistance: Independent/Self  Facial Expression: Brightened  Affect: Appropriate, Congruent  Level of Consciousness: Alert  Frequency of Checks: 4 times per hour, close  Mood:Normal: No  Mood: Anxious  Motor Activity:Normal: Yes  Motor Activity: Decreased  Eye Contact: Good  Observed Behavior: Friendly, Cooperative  Sexual Misconduct History: Current - no  Preception: Loco to person, Loco to time, Loco to place, Loco to

## 2024-06-30 NOTE — GROUP NOTE
Date: 6/30/2024  Start Time: 1035  End Time:  1115  Number of Participants: 14    Type of Group: Psychoeducation    Wellness Binder Information  Module Name:  Five ways to well being    Patient's Goal:  Patient will be able to ID one or two ways to improve overall well being.  Patient will exhibit an increased knowledge of improving overall wellness.     Notes:  CTRS discussed five components of being wellbeing, and ways to improve well being.  Patient was an active participant in discussion and was accepting of handout.      Status After Intervention:  Improved    Participation Level: Active Listener and Interactive    Participation Quality: Appropriate and Attentive      Speech:  normal      Thought Process/Content: Logical      Affective Functioning: Congruent      Mood:  content      Level of consciousness:  Alert and Attentive      Response to Learning: Able to verbalize current knowledge/experience, Able to verbalize/acknowledge new learning, and Progressing to goal      Endings: None Reported    Modes of Intervention: Education, Exploration, Clarifying, and Problem-solving      Discipline Responsible: Recreational Therapist      Signature:  BURTON Moe

## 2024-06-30 NOTE — TRANSITION OF CARE
Behavioral Health Transition Record    Patient Name: Tanya Ware  YOB: 1995   Medical Record Number: [id number]  Date of Admission: 6/22/2024  1:54 AM   Date of Discharge: 6/30/2024     Attending Provider: Christiano Solo MD   Discharging Provider:  Dr. Solo  To contact this individual call  [phone]  and ask the  to page.  If unavailable, ask to be transferred to Behavioral Health Provider on call.  A Behavioral Health Provider will be available on call 24/7 and during holidays.    Primary Care Provider: No primary care provider on file.    No Known Allergies    Reason for Admission: Tanya Ware is a 28-year-old  female depression and substance abuse who was brought to the hospital for suicidal ideations.  She was pulled over by the police for speeding and having a \"mental breakdown\". She was involuntarily admitted to  S. for psychiatric stabilization and evaluation.  Urine drugs and positive for buprenorphine.  She was medically cleared and admitted to [facility].  Duration of symptoms is unknown and precipitating factors are unknown.       Admission Diagnosis: Mood disorder (HCC) [F39]  Acute psychosis (HCC) [F23]    * No surgery found *    Results for orders placed or performed during the hospital encounter of 06/22/24   C.trachomatis N.gonorrhoeae DNA, Urine    Specimen: Urine   Result Value Ref Range    Specimen Description .URINE     C. trachomatis DNA ,Urine NEGATIVE NEGATIVE    N. gonorrhoeae DNA, Urine NEGATIVE NEGATIVE   Culture, Urine    Specimen: Urine, clean catch   Result Value Ref Range    Specimen Description .CLEAN CATCH URINE     Special Requests Site: Urine     Culture (A)      Mixed johnnie isolated. Further workup and sensitivity testing not routinely indicated and will not be perfomed. Mixed johnnie isolated includes:    Culture MIXED GRAM POSITIVE ORGANISMS (A)     Culture GRAM POSITIVE RODS RESEMBLING DIPHTHEROIDS (A)    CBC with Auto Differential   Result Value  Adbry Counseling: I discussed with the patient the risks of tralokinumab including but not limited to eye infection and irritation, cold sores, injection site reactions, worsening of asthma, allergic reactions and increased risk of parasitic infection.  Live vaccines should be avoided while taking tralokinumab. The patient understands that monitoring is required and they must alert us or the primary physician if symptoms of infection or other concerning signs are noted.

## 2024-06-30 NOTE — CARE COORDINATION
Pt's brother, Jey, returned my phone call.  He said that he will  his sister as soon as she is discharged.  He said that there are no guns or weapons in the home and he has no concerns for the pt.

## 2024-06-30 NOTE — CARE COORDINATION
Faizan called pt's brotherJey (RAN) [phone].  CORAL signed.  Left voicemail.    FAIZAN met with pt to discuss discharge for today. Pt is alert and oriented x4. Pt's mood is excited, affect is congruent. Pt's speech is clear, rate and volume is appropriate. Pt's insight and judgement is good. Pt denied depression and anxiety. Pt denies SI, HI, AVH. Pt reported that she will be discharging to her brother's house and he will be picking her up from the hospital. Pt stated that she is looking forward to seeing her nine year old daughter and finding a job. Pt stated \"the meds she is on are working great and that she hasn't felt this good in a long time.\"       Pt has follow up appointments scheduled at [facility]. Pt needs to attend substance abuse treatment at [facility] On Tuesday or Thursday from 5-9 pm and medication management appointment on [facility] on 7/10 at 11 am with Karli.    In order to ensure appropriate transition and discharge planning is in place, the following documents have been transmitted to [facility] and [facility], as the new outpatient provider:    The d/c diagnosis was transmitted to the next care provider  The reason for hospitalization was transmitted to the next care provider  The d/c medications (dosage and indication) were transmitted to the next care provider   The continuing care plan was transmitted to the next care provider

## 2024-06-30 NOTE — DISCHARGE SUMMARY
DISCHARGE SUMMARY      Patient ID:  Tanya Ware  [id number]  28 y.o.  1995    Admit date: 6/22/2024    Discharge date and time: 6/30/2024    Admitting Physician: Christiano Solo MD     Discharge Physician: Dr Germania JOSUE    Discharge Diagnoses:   Patient Active Problem List   Diagnosis    Substance induced mood disorder (HCC)       Admission Condition: poor    Discharged Condition: stable    Admission Circumstance: Tanya Ware is a 28-year-old  female depression and substance abuse who was brought to the hospital for suicidal ideations.  She was pulled over by the police for speeding and having a \"mental breakdown\". She was involuntarily admitted to [facility] for psychiatric stabilization and evaluation.  Urine drugs and positive for buprenorphine.  She was medically cleared and admitted to [facility].  Duration of symptoms is unknown and precipitating factors are unknown.       PAST MEDICAL/PSYCHIATRIC HISTORY:   Past Medical History:   Diagnosis Date    Anxiety     Depression        FAMILY/SOCIAL HISTORY:  History reviewed. No pertinent family history.  Social History     Socioeconomic History    Marital status: Unknown     Spouse name: Not on file    Number of children: Not on file    Years of education: Not on file    Highest education level: Not on file   Occupational History    Not on file   Tobacco Use    Smoking status: Unknown    Smokeless tobacco: Not on file   Substance and Sexual Activity    Alcohol use: Defer    Drug use: Defer    Sexual activity: Defer   Other Topics Concern    Not on file   Social History Narrative    Not on file     Social Determinants of Health     Financial Resource Strain: Not on file   Food Insecurity: No Food Insecurity (6/23/2024)    Hunger Vital Sign     Worried About Running Out of Food in the Last Year: Never true     Ran Out of Food in the Last Year: Never true   Transportation Needs: No Transportation Needs (6/23/2024)    PRAPARE - Transportation     Lack of

## 2024-06-30 NOTE — GROUP NOTE
Group Therapy Note    Date: 6/30/2024  Start Time: 0800  End Time:  0820  Number of Participants: 16    Type of Group: Community Meeting    Wellness Binder Information  Module Name:  Community Meeting      Patient's Goal:  Patient will be oriented to the unit including but not limited expectations, staff, programming schedule.  Patient will be able to ID expectations of treatment.     Notes: Patient was a participant in community meeting, and was updated on expectations of the unit, staffing, programming schedule, and acclimated to their environment.    Patient shared goal for today as \"discharge and finish my breakfast\"    Status After Intervention:  Improved    Participation Level: Active Listener and Interactive    Participation Quality: Appropriate and Attentive      Speech:  normal      Thought Process/Content: Logical      Affective Functioning: Congruent      Mood:  content      Level of consciousness:  Alert and Attentive      Response to Learning: Able to verbalize current knowledge/experience, Able to verbalize/acknowledge new learning, and Progressing to goal      Endings: None Reported    Modes of Intervention: Exploration, Clarifying, and Problem-solving      Discipline Responsible: Recreational Therapist      Signature:  BURTON Moe

## 2024-06-30 NOTE — PLAN OF CARE
Problem: Depression  Goal: Will be euthymic at discharge  Description: INTERVENTIONS:  1. Administer medication as ordered  2. Provide emotional support via 1:1 interaction with staff  3. Encourage involvement in milieu/groups/activities  4. Monitor for social isolation  6/23/2024 0938 by Malik Marion RN  Outcome: Progressing  6/23/2024 0520 by Ale Green RN  Outcome: Progressing  6/22/2024 2138 by Cindy Bryant RN  Outcome: Progressing     Problem: Karla  Goal: Will exhibit normal sleep and speech and no impulsivity  Description: INTERVENTIONS:  1. Administer medication as ordered  2. Set limits on impulsive behavior  3. Make attempts to decrease external stimuli as possible  6/23/2024 0938 by Malik Marion RN  Outcome: Progressing     Problem: Karla  Goal: Will exhibit normal sleep and speech and no impulsivity  Description: INTERVENTIONS:  1. Administer medication as ordered  2. Set limits on impulsive behavior  3. Make attempts to decrease external stimuli as possible  6/23/2024 0938 by Malik Marion RN  Outcome: Progressing  6/23/2024 0520 by Ale Green RN  Outcome: Progressing     Problem: Psychosis  Goal: Will report no hallucinations or delusions  Description: INTERVENTIONS:  1. Administer medication as  ordered  2. Assist with reality testing to support increasing orientation  3. Assess if patient's hallucinations or delusions are encouraging self harm or harm to others and intervene as appropriate  6/23/2024 0520 by Ale Green RN  Outcome: Progressing     
  Problem: Depression  Goal: Will be euthymic at discharge  Description: INTERVENTIONS:  1. Administer medication as ordered  2. Provide emotional support via 1:1 interaction with staff  3. Encourage involvement in milieu/groups/activities  4. Monitor for social isolation  6/27/2024 2120 by Gris Costello, RN  Outcome: Progressing     Problem: Behavior  Goal: Pt/Family maintain appropriate behavior and adhere to behavioral management agreement, if implemented  Description: INTERVENTIONS:  1. Assess patient/family's coping skills and  non-compliant behavior (including use of illegal substances)  2. Notify security of behavior or suspected illegal substances which indicate the need for search of the family and/or belongings  3. Encourage verbalization of thoughts and concerns in a socially appropriate manner  4. Utilize positive, consistent limit setting strategies supporting safety of patient, staff and others  5. Encourage participation in the decision making process about the behavioral management agreement  6. If a visitor's behavior poses a threat to safety call refer to organization policy.  7. Initiate consult with , Psychosocial CNS, Spiritual Care as appropriate  Outcome: Progressing     Patient has been withdrawn to her room. Had underlying irritability during assessment, but was cooperative. Affect was flat and mood anxious. States that she is \"ready to go home\". Denies anxiety and depression. Denies suicidal/homicidal ideations and hallucinations. Purposeful rounding continued.   
  Problem: Risk for Elopement  Goal: Patient will not exit the unit/facility without proper excort  6/25/2024 0037 by Gwendolyn Peoples RN  Outcome: Progressing  6/24/2024 2035 by Bettye Friend RN  Outcome: Progressing  Flowsheets (Taken 6/24/2024 1058 by Malik Marion, RN)  Nursing Interventions for Elopement Risk: Assist with personal care needs such as toileting, eating, dressing, as needed to reduce the risk of wandering     Problem: Risk for Elopement  Goal: Patient will not exit the unit/facility without proper excort  6/25/2024 0037 by Gwendolyn Peoples RN  Outcome: Progressing  6/24/2024 2035 by Bettye Friend RN  Outcome: Progressing  Flowsheets (Taken 6/24/2024 1058 by Malik Marion RN)  Nursing Interventions for Elopement Risk: Assist with personal care needs such as toileting, eating, dressing, as needed to reduce the risk of wandering     Problem: Self Harm/Suicidality  Goal: Will have no self-injury during hospital stay  Description: INTERVENTIONS:  1.  Ensure constant observer at bedside with Q15M safety checks  2.  Maintain a safe environment  3.  Secure patient belongings  4.  Ensure family/visitors adhere to safety recommendations  5.  Ensure safety tray has been added to patient's diet order  6.  Every shift and PRN: Re-assess suicidal risk via Frequent Screener    6/25/2024 0037 by Gwendolyn Peoples RN  Outcome: Progressing  6/24/2024 2035 by Bettye Friend RN  Outcome: Progressing  6/24/2024 1102 by Malik Marion RN  Outcome: Progressing  Flowsheets (Taken 6/24/2024 1058)  Will have no self-injury during hospital stay: Ensure constant observer at bedside with Q15M safety checks     Problem: Self Harm/Suicidality  Goal: Will have no self-injury during hospital stay  Description: INTERVENTIONS:  1.  Ensure constant observer at bedside with Q15M safety checks  2.  Maintain a safe environment  3.  Secure patient belongings  4.  Ensure family/visitors adhere to safety recommendations  5.  Ensure safety 
  Problem: Risk for Elopement  Goal: Patient will not exit the unit/facility without proper excort  6/25/2024 1907 by Cindy Bryant RN  Outcome: Progressing  6/25/2024 1858 by Cindy Bryant RN  Outcome: Progressing     Problem: Self Harm/Suicidality  Goal: Will have no self-injury during hospital stay  Description: INTERVENTIONS:  1.  Ensure constant observer at bedside with Q15M safety checks  2.  Maintain a safe environment  3.  Secure patient belongings  4.  Ensure family/visitors adhere to safety recommendations  5.  Ensure safety tray has been added to patient's diet order  6.  Every shift and PRN: Re-assess suicidal risk via Frequent Screener    6/25/2024 1907 by Cindy Bryant RN  Outcome: Progressing  6/25/2024 1858 by Cindy Bryant RN  Outcome: Progressing     Problem: Depression  Goal: Will be euthymic at discharge  Description: INTERVENTIONS:  1. Administer medication as ordered  2. Provide emotional support via 1:1 interaction with staff  3. Encourage involvement in milieu/groups/activities  4. Monitor for social isolation  6/25/2024 1907 by Cindy Bryant RN  Outcome: Progressing  6/25/2024 1858 by Cindy Bryant RN  Outcome: Progressing     
  Problem: Risk for Elopement  Goal: Patient will not exit the unit/facility without proper excort  6/26/2024 2013 by Cindy Bryant RN  Outcome: Progressing  6/26/2024 1153 by Korina Wick RN  Outcome: Progressing     Problem: Self Harm/Suicidality  Goal: Will have no self-injury during hospital stay  Description: INTERVENTIONS:  1.  Ensure constant observer at bedside with Q15M safety checks  2.  Maintain a safe environment  3.  Secure patient belongings  4.  Ensure family/visitors adhere to safety recommendations  5.  Ensure safety tray has been added to patient's diet order  6.  Every shift and PRN: Re-assess suicidal risk via Frequent Screener    6/26/2024 2013 by Cindy Bryant RN  Outcome: Progressing  6/26/2024 1153 by Korina Wick RN  Outcome: Progressing     Problem: Depression  Goal: Will be euthymic at discharge  Description: INTERVENTIONS:  1. Administer medication as ordered  2. Provide emotional support via 1:1 interaction with staff  3. Encourage involvement in milieu/groups/activities  4. Monitor for social isolation  6/26/2024 2013 by Cindy Bryant RN  Outcome: Progressing  6/26/2024 1153 by Korina Wick RN  Outcome: Progressing     Problem: Karla  Goal: Will exhibit normal sleep and speech and no impulsivity  Description: INTERVENTIONS:  1. Administer medication as ordered  2. Set limits on impulsive behavior  3. Make attempts to decrease external stimuli as possible  6/26/2024 2013 by Cindy Bryant RN  Outcome: Progressing  6/26/2024 1153 by Korina Wick RN  Outcome: Progressing     Problem: Psychosis  Goal: Will report no hallucinations or delusions  Description: INTERVENTIONS:  1. Administer medication as  ordered  2. Assist with reality testing to support increasing orientation  3. Assess if patient's hallucinations or delusions are encouraging self harm or harm to others and intervene as appropriate  6/26/2024 1153 by Korina Wick RN  Outcome: Progressing     Problem: 
  Problem: Risk for Elopement  Goal: Patient will not exit the unit/facility without proper excort  6/27/2024 0910 by Tanya Rondon RN  Outcome: Progressing     Problem: Self Harm/Suicidality  Goal: Will have no self-injury during hospital stay  Description: INTERVENTIONS:  1.  Ensure constant observer at bedside with Q15M safety checks  2.  Maintain a safe environment  3.  Secure patient belongings  4.  Ensure family/visitors adhere to safety recommendations  5.  Ensure safety tray has been added to patient's diet order  6.  Every shift and PRN: Re-assess suicidal risk via Frequent Screener    6/27/2024 0910 by Tanya Rondon RN  Outcome: Progressing     Problem: Depression  Goal: Will be euthymic at discharge  Description: INTERVENTIONS:  1. Administer medication as ordered  2. Provide emotional support via 1:1 interaction with staff  3. Encourage involvement in milieu/groups/activities  4. Monitor for social isolation  6/27/2024 0910 by Tanya Rondon RN  Outcome: Progressing      Patient denies SI/HI/Hallucinations Patient in control of behaviors. Patient has been medication compliant. Patient ate breakfast 100 percent. No active distress noted, respirations even and unlabored. Will continue to monitor and will intervene as needed with close intermittent 15 minute rounds.      
  Problem: Risk for Elopement  Goal: Patient will not exit the unit/facility without proper excort  Outcome: Progressing     Problem: Self Harm/Suicidality  Goal: Will have no self-injury during hospital stay  Description: INTERVENTIONS:  1.  Ensure constant observer at bedside with Q15M safety checks  2.  Maintain a safe environment  3.  Secure patient belongings  4.  Ensure family/visitors adhere to safety recommendations  5.  Ensure safety tray has been added to patient's diet order  6.  Every shift and PRN: Re-assess suicidal risk via Frequent Screener    6/29/2024 2228 by Mandy Rae RN  Outcome: Progressing     Problem: Depression  Goal: Will be euthymic at discharge  Description: INTERVENTIONS:  1. Administer medication as ordered  2. Provide emotional support via 1:1 interaction with staff  3. Encourage involvement in milieu/groups/activities  4. Monitor for social isolation  6/29/2024 2228 by Mandy Rae RN  Outcome: Progressing     Problem: Karla  Goal: Will exhibit normal sleep and speech and no impulsivity  Description: INTERVENTIONS:  1. Administer medication as ordered  2. Set limits on impulsive behavior  3. Make attempts to decrease external stimuli as possible  6/29/2024 2228 by Mandy Rae RN  Outcome: Progressing     Problem: Psychosis  Goal: Will report no hallucinations or delusions  Description: INTERVENTIONS:  1. Administer medication as  ordered  2. Assist with reality testing to support increasing orientation  3. Assess if patient's hallucinations or delusions are encouraging self harm or harm to others and intervene as appropriate  Outcome: Progressing     Problem: Behavior  Goal: Pt/Family maintain appropriate behavior and adhere to behavioral management agreement, if implemented  Description: INTERVENTIONS:  1. Assess patient/family's coping skills and  non-compliant behavior (including use of illegal substances)  2. Notify security of behavior or suspected illegal 
  Problem: Risk for Elopement  Goal: Patient will not exit the unit/facility without proper excort  Outcome: Progressing     Problem: Self Harm/Suicidality  Goal: Will have no self-injury during hospital stay  Description: INTERVENTIONS:  1.  Ensure constant observer at bedside with Q15M safety checks  2.  Maintain a safe environment  3.  Secure patient belongings  4.  Ensure family/visitors adhere to safety recommendations  5.  Ensure safety tray has been added to patient's diet order  6.  Every shift and PRN: Re-assess suicidal risk via Frequent Screener    Outcome: Progressing     Problem: Depression  Goal: Will be euthymic at discharge  Description: INTERVENTIONS:  1. Administer medication as ordered  2. Provide emotional support via 1:1 interaction with staff  3. Encourage involvement in milieu/groups/activities  4. Monitor for social isolation  6/28/2024 1026 by Tanya Rondon RN  Outcome: Progressing     Problem: Karla  Goal: Will exhibit normal sleep and speech and no impulsivity  Description: INTERVENTIONS:  1. Administer medication as ordered  2. Set limits on impulsive behavior  3. Make attempts to decrease external stimuli as possible  Outcome: Progressing    Patient denies SI/HI/Hallucinations. Patient is in control of behaviors. Patient is medication compliant. Patient is discharged focused and is denying anxiety and depression. No active distress noted, respirations even and unlabored. Will continue to monitor and will intervene as needed with close intermittent 15 minute rounds.      
  Problem: Risk for Elopement  Goal: Patient will not exit the unit/facility without proper excort  Outcome: Progressing     Problem: Self Harm/Suicidality  Goal: Will have no self-injury during hospital stay  Description: INTERVENTIONS:  1.  Ensure constant observer at bedside with Q15M safety checks  2.  Maintain a safe environment  3.  Secure patient belongings  4.  Ensure family/visitors adhere to safety recommendations  5.  Ensure safety tray has been added to patient's diet order  6.  Every shift and PRN: Re-assess suicidal risk via Frequent Screener    Outcome: Progressing     Problem: Depression  Goal: Will be euthymic at discharge  Description: INTERVENTIONS:  1. Administer medication as ordered  2. Provide emotional support via 1:1 interaction with staff  3. Encourage involvement in milieu/groups/activities  4. Monitor for social isolation  Outcome: Progressing       Pt has been isolative to room for majority of shift. She is calm and cooperative. She is taking meds and attending groups. Pt denied homicidal/suicidal thoughts/hallucinations.  
  Problem: Risk for Elopement  Goal: Patient will not exit the unit/facility without proper excort  Outcome: Progressing     Problem: Self Harm/Suicidality  Goal: Will have no self-injury during hospital stay  Description: INTERVENTIONS:  1.  Ensure constant observer at bedside with Q15M safety checks  2.  Maintain a safe environment  3.  Secure patient belongings  4.  Ensure family/visitors adhere to safety recommendations  5.  Ensure safety tray has been added to patient's diet order  6.  Every shift and PRN: Re-assess suicidal risk via Frequent Screener    Outcome: Progressing     Problem: Depression  Goal: Will be euthymic at discharge  Description: INTERVENTIONS:  1. Administer medication as ordered  2. Provide emotional support via 1:1 interaction with staff  3. Encourage involvement in milieu/groups/activities  4. Monitor for social isolation  Outcome: Progressing     Problem: Karla  Goal: Will exhibit normal sleep and speech and no impulsivity  Description: INTERVENTIONS:  1. Administer medication as ordered  2. Set limits on impulsive behavior  3. Make attempts to decrease external stimuli as possible  Outcome: Progressing     Problem: Psychosis  Goal: Will report no hallucinations or delusions  Description: INTERVENTIONS:  1. Administer medication as  ordered  2. Assist with reality testing to support increasing orientation  3. Assess if patient's hallucinations or delusions are encouraging self harm or harm to others and intervene as appropriate  Outcome: Progressing     Problem: Behavior  Goal: Pt/Family maintain appropriate behavior and adhere to behavioral management agreement, if implemented  Description: INTERVENTIONS:  1. Assess patient/family's coping skills and  non-compliant behavior (including use of illegal substances)  2. Notify security of behavior or suspected illegal substances which indicate the need for search of the family and/or belongings  3. Encourage verbalization of thoughts and 
  Problem: Risk for Elopement  Goal: Patient will not exit the unit/facility without proper excort  Outcome: Progressing  Flowsheets (Taken 6/22/2024 2000)  Nursing Interventions for Elopement Risk: Assist with personal care needs such as toileting, eating, dressing, as needed to reduce the risk of wandering     Problem: Self Harm/Suicidality  Goal: Will have no self-injury during hospital stay  Description: INTERVENTIONS:  1.  Ensure constant observer at bedside with Q15M safety checks  2.  Maintain a safe environment  3.  Secure patient belongings  4.  Ensure family/visitors adhere to safety recommendations  5.  Ensure safety tray has been added to patient's diet order  6.  Every shift and PRN: Re-assess suicidal risk via Frequent Screener    Outcome: Progressing     Problem: Depression  Goal: Will be euthymic at discharge  Description: INTERVENTIONS:  1. Administer medication as ordered  2. Provide emotional support via 1:1 interaction with staff  3. Encourage involvement in milieu/groups/activities  4. Monitor for social isolation  Outcome: Progressing       Pt has been isolative to room for entire shift. She is resting with her eyes closed. She cannot focus for assessment or answer questions at this time.   
  Problem: Self Harm/Suicidality  Goal: Will have no self-injury during hospital stay  Description: INTERVENTIONS:  1.  Ensure constant observer at bedside with Q15M safety checks  2.  Maintain a safe environment  3.  Secure patient belongings  4.  Ensure family/visitors adhere to safety recommendations  5.  Ensure safety tray has been added to patient's diet order  6.  Every shift and PRN: Re-assess suicidal risk via Frequent Screener    6/24/2024 1102 by Malik Marion, RN  Outcome: Progressing  Flowsheets (Taken 6/24/2024 1058)  Will have no self-injury during hospital stay: Ensure constant observer at bedside with Q15M safety checks  6/24/2024 0445 by Ale Green, RN  Outcome: Progressing     
  Problem: Self Harm/Suicidality  Goal: Will have no self-injury during hospital stay  Description: INTERVENTIONS:  1.  Ensure constant observer at bedside with Q15M safety checks  2.  Maintain a safe environment  3.  Secure patient belongings  4.  Ensure family/visitors adhere to safety recommendations  5.  Ensure safety tray has been added to patient's diet order  6.  Every shift and PRN: Re-assess suicidal risk via Frequent Screener    Outcome: Progressing     Problem: Depression  Goal: Will be euthymic at discharge  Description: INTERVENTIONS:  1. Administer medication as ordered  2. Provide emotional support via 1:1 interaction with staff  3. Encourage involvement in milieu/groups/activities  4. Monitor for social isolation  Outcome: Progressing     Problem: Karla  Goal: Will exhibit normal sleep and speech and no impulsivity  Description: INTERVENTIONS:  1. Administer medication as ordered  2. Set limits on impulsive behavior  3. Make attempts to decrease external stimuli as possible  Outcome: Progressing       Pleasant and cooperative. Brighten with conversation. Denies suicidal and homicidal thoughts. Denies suicidal and homicidal thoughts. Denies hallucinations  
  Problem: Self Harm/Suicidality  Goal: Will have no self-injury during hospital stay  Description: INTERVENTIONS:  1.  Ensure constant observer at bedside with Q15M safety checks  2.  Maintain a safe environment  3.  Secure patient belongings  4.  Ensure family/visitors adhere to safety recommendations  5.  Ensure safety tray has been added to patient's diet order  6.  Every shift and PRN: Re-assess suicidal risk via Frequent Screener  Outcome: Progressing     Problem: Depression  Goal: Will be euthymic at discharge  Description: INTERVENTIONS:  1. Administer medication as ordered  2. Provide emotional support via 1:1 interaction with staff  3. Encourage involvement in milieu/groups/activities  4. Monitor for social isolation  Outcome: Progressing     Problem: Psychosis  Goal: Will report no hallucinations or delusions  Description: INTERVENTIONS:  1. Administer medication as  ordered  2. Assist with reality testing to support increasing orientation  3. Assess if patient's hallucinations or delusions are encouraging self harm or harm to others and intervene as appropriate  Outcome: Progressing     Problem: Behavior  Goal: Pt/Family maintain appropriate behavior and adhere to behavioral management agreement, if implemented  Description: INTERVENTIONS:  1. Assess patient/family's coping skills and  non-compliant behavior (including use of illegal substances)  2. Notify security of behavior or suspected illegal substances which indicate the need for search of the family and/or belongings  3. Encourage verbalization of thoughts and concerns in a socially appropriate manner  4. Utilize positive, consistent limit setting strategies supporting safety of patient, staff and others  5. Encourage participation in the decision making process about the behavioral management agreement  6. If a visitor's behavior poses a threat to safety call refer to organization policy.  7. Initiate consult with , Psychosocial CNS, 
11-7am shift note:  Pt observed to be asleep with easy even respirations at HS rounds since 2300  
Pt was observed to be asleep at close observation staggered q 15 min checks.  
delusions are encouraging self harm or harm to others and intervene as appropriate  Outcome: Progressing     Problem: Behavior  Goal: Pt/Family maintain appropriate behavior and adhere to behavioral management agreement, if implemented  Description: INTERVENTIONS:  1. Assess patient/family's coping skills and  non-compliant behavior (including use of illegal substances)  2. Notify security of behavior or suspected illegal substances which indicate the need for search of the family and/or belongings  3. Encourage verbalization of thoughts and concerns in a socially appropriate manner  4. Utilize positive, consistent limit setting strategies supporting safety of patient, staff and others  5. Encourage participation in the decision making process about the behavioral management agreement  6. If a visitor's behavior poses a threat to safety call refer to organization policy.  7. Initiate consult with , Psychosocial CNS, Spiritual Care as appropriate  Outcome: Progressing  Flowsheets (Taken 6/24/2024 1058 by Malik Marion, RN)  Patient/family maintains appropriate behavior and adheres to behavioral management agreement, if implemented: Assess patient/family’s coping skills and  non-compliant behavior (including use of illegal substances)     Problem: Anxiety  Goal: Will report anxiety at manageable levels  Description: INTERVENTIONS:  1. Administer medication as ordered  2. Teach and rehearse alternative coping skills  3. Provide emotional support with 1:1 interaction with staff  Outcome: Progressing  Flowsheets (Taken 6/24/2024 1058 by Malik Marion, RN)  Will report anxiety at manageable levels: Administer medication as ordered     Problem: Involuntary Admit  Goal: Will cooperate with staff recommendations and doctor's orders and will demonstrate appropriate behavior  Description: INTERVENTIONS:  1. Treat underlying conditions and offer medication as ordered  2. Educate regarding involuntary admission

## 2024-06-30 NOTE — BH NOTE
Patient denies suicidal ideation, homicidal ideations and AVH. Denies anxiety and depression at this time. Presents calm and cooperative during assessment.  Patient is isolative to her room but did join her peers for HS snack.  Medications taken without issue.  No complaints or concerns verbalized at this time.  No unit problems reported.  Will continue to observe and support.

## 2024-09-25 ENCOUNTER — TELEPHONE (OUTPATIENT)
Dept: PRIMARY CARE CLINIC | Age: 29
End: 2024-09-25